# Patient Record
Sex: FEMALE | Race: WHITE | NOT HISPANIC OR LATINO | Employment: OTHER
[De-identification: names, ages, dates, MRNs, and addresses within clinical notes are randomized per-mention and may not be internally consistent; named-entity substitution may affect disease eponyms.]

---

## 2017-08-05 ENCOUNTER — HEALTH MAINTENANCE LETTER (OUTPATIENT)
Age: 32
End: 2017-08-05

## 2019-11-05 ENCOUNTER — HEALTH MAINTENANCE LETTER (OUTPATIENT)
Age: 34
End: 2019-11-05

## 2020-09-12 ENCOUNTER — NURSE TRIAGE (OUTPATIENT)
Dept: NURSING | Facility: CLINIC | Age: 35
End: 2020-09-12

## 2020-09-13 NOTE — TELEPHONE ENCOUNTER
has tested positive for Coronavirus: just found out tonight. He is concerned about his family's exposure and current sx of cough. His wife has a history of type 2 diabetes and takes medication. .  PCP: [facility] ([facility] in [facility].   Advised to contact oncall provider now, or do a virtual visit via internet with [email or web address]. She will call her clinic now.     Reason for Disposition    HIGH RISK patient (e.g., age > 64 years, diabetes, heart or lung disease, weak immune system) (Exception: Has already been evaluated by healthcare provider and has no new or worsening symptoms)    Additional Information    Negative: SEVERE difficulty breathing (e.g., struggling for each breath, speaks in single words)    Negative: Difficult to awaken or acting confused (e.g., disoriented, slurred speech)    Negative: Bluish (or gray) lips or face now    Negative: Shock suspected (e.g., cold/pale/clammy skin, too weak to stand, low BP, rapid pulse)    Negative: Sounds like a life-threatening emergency to the triager    Negative: [1] COVID-19 exposure AND [2] no symptoms    Negative: COVID-19 and Breastfeeding, questions about    Negative: [1] Adult with possible COVID-19 symptoms AND [2] triager concerned about severity of symptoms or other causes    Negative: SEVERE or constant chest pain or pressure (Exception: mild central chest pain, present only when coughing)    Negative: MODERATE difficulty breathing (e.g., speaks in phrases, SOB even at rest, pulse 100-120)    Negative: Patient sounds very sick or weak to the triager    Negative: MILD difficulty breathing (e.g., minimal/no SOB at rest, SOB with walking, pulse <100)    Negative: Chest pain or pressure    Negative: Fever > 103 F (39.4 C)    Negative: [1] Fever > 101 F (38.3 C) AND [2] age > 60    Negative: [1] Fever > 100.0 F (37.8 C) AND [2] bedridden (e.g., nursing home patient, CVA, chronic illness, recovering from surgery)    Protocols used:  CORONAVIRUS (COVID-19) DIAGNOSED OR [id number] 8.4.20

## 2020-11-22 ENCOUNTER — HEALTH MAINTENANCE LETTER (OUTPATIENT)
Age: 35
End: 2020-11-22

## 2021-01-30 ENCOUNTER — APPOINTMENT (OUTPATIENT)
Dept: ULTRASOUND IMAGING | Facility: CLINIC | Age: 36
End: 2021-01-30
Attending: EMERGENCY MEDICINE
Payer: COMMERCIAL

## 2021-01-30 ENCOUNTER — HOSPITAL ENCOUNTER (EMERGENCY)
Facility: CLINIC | Age: 36
Discharge: HOME | End: 2021-01-30
Attending: EMERGENCY MEDICINE | Admitting: EMERGENCY MEDICINE
Payer: COMMERCIAL

## 2021-01-30 ENCOUNTER — APPOINTMENT (OUTPATIENT)
Dept: CT IMAGING | Facility: CLINIC | Age: 36
End: 2021-01-30
Attending: EMERGENCY MEDICINE
Payer: COMMERCIAL

## 2021-01-30 VITALS
TEMPERATURE: 97.8 F | HEART RATE: 103 BPM | DIASTOLIC BLOOD PRESSURE: 90 MMHG | BODY MASS INDEX: 32.44 KG/M2 | WEIGHT: 190 LBS | RESPIRATION RATE: 14 BRPM | SYSTOLIC BLOOD PRESSURE: 130 MMHG | HEIGHT: 64 IN | OXYGEN SATURATION: 100 %

## 2021-01-30 DIAGNOSIS — K59.00 CONSTIPATION, UNSPECIFIED CONSTIPATION TYPE: ICD-10-CM

## 2021-01-30 DIAGNOSIS — R11.0 NAUSEA: ICD-10-CM

## 2021-01-30 DIAGNOSIS — R91.8 PULMONARY NODULES: ICD-10-CM

## 2021-01-30 DIAGNOSIS — R10.31 ABDOMINAL PAIN, RIGHT LOWER QUADRANT: ICD-10-CM

## 2021-01-30 LAB
ALBUMIN SERPL-MCNC: 3.4 G/DL (ref 3.4–5)
ALBUMIN UR-MCNC: NEGATIVE MG/DL
ALP SERPL-CCNC: 93 U/L (ref 40–150)
ALT SERPL W P-5'-P-CCNC: 19 U/L (ref 0–50)
ANION GAP SERPL CALCULATED.3IONS-SCNC: 8 MMOL/L (ref 3–14)
APPEARANCE UR: CLEAR
AST SERPL W P-5'-P-CCNC: 13 U/L (ref 0–45)
B-HCG FREE SERPL-ACNC: <5 IU/L
BACTERIA #/AREA URNS HPF: ABNORMAL /HPF
BASOPHILS # BLD AUTO: 0 10E9/L (ref 0–0.2)
BASOPHILS NFR BLD AUTO: 0.3 %
BILIRUB SERPL-MCNC: 0.3 MG/DL (ref 0.2–1.3)
BILIRUB UR QL STRIP: NEGATIVE
BUN SERPL-MCNC: 13 MG/DL (ref 7–30)
CALCIUM SERPL-MCNC: 8.9 MG/DL (ref 8.5–10.1)
CHLORIDE SERPL-SCNC: 102 MMOL/L (ref 94–109)
CO2 SERPL-SCNC: 24 MMOL/L (ref 20–32)
COLOR UR AUTO: ABNORMAL
CREAT SERPL-MCNC: 0.5 MG/DL (ref 0.52–1.04)
DIFFERENTIAL METHOD BLD: ABNORMAL
EOSINOPHIL # BLD AUTO: 0.2 10E9/L (ref 0–0.7)
EOSINOPHIL NFR BLD AUTO: 1.7 %
ERYTHROCYTE [DISTWIDTH] IN BLOOD BY AUTOMATED COUNT: 11.8 % (ref 10–15)
GFR SERPL CREATININE-BSD FRML MDRD: >90 ML/MIN/{1.73_M2}
GLUCOSE SERPL-MCNC: 288 MG/DL (ref 70–99)
GLUCOSE UR STRIP-MCNC: >1000 MG/DL
HCT VFR BLD AUTO: 42.9 % (ref 35–47)
HGB BLD-MCNC: 14.8 G/DL (ref 11.7–15.7)
HGB UR QL STRIP: NEGATIVE
IMM GRANULOCYTES # BLD: 0 10E9/L (ref 0–0.4)
IMM GRANULOCYTES NFR BLD: 0.1 %
KETONES UR STRIP-MCNC: 10 MG/DL
LEUKOCYTE ESTERASE UR QL STRIP: NEGATIVE
LIPASE SERPL-CCNC: 212 U/L (ref 73–393)
LYMPHOCYTES # BLD AUTO: 4.2 10E9/L (ref 0.8–5.3)
LYMPHOCYTES NFR BLD AUTO: 45.9 %
MCH RBC QN AUTO: 27.4 PG (ref 26.5–33)
MCHC RBC AUTO-ENTMCNC: 34.5 G/DL (ref 31.5–36.5)
MCV RBC AUTO: 79 FL (ref 78–100)
MONOCYTES # BLD AUTO: 0.6 10E9/L (ref 0–1.3)
MONOCYTES NFR BLD AUTO: 6.2 %
MUCOUS THREADS #/AREA URNS LPF: PRESENT /LPF
NEUTROPHILS # BLD AUTO: 4.2 10E9/L (ref 1.6–8.3)
NEUTROPHILS NFR BLD AUTO: 45.8 %
NITRATE UR QL: NEGATIVE
NRBC # BLD AUTO: 0 10*3/UL
NRBC BLD AUTO-RTO: 0 /100
PH UR STRIP: 5.5 PH (ref 5–7)
PLATELET # BLD AUTO: 303 10E9/L (ref 150–450)
POTASSIUM SERPL-SCNC: 3.5 MMOL/L (ref 3.4–5.3)
PROT SERPL-MCNC: 7.3 G/DL (ref 6.8–8.8)
RBC # BLD AUTO: 5.4 10E12/L (ref 3.8–5.2)
RBC #/AREA URNS AUTO: 0 /HPF (ref 0–2)
SODIUM SERPL-SCNC: 134 MMOL/L (ref 133–144)
SOURCE: ABNORMAL
SP GR UR STRIP: 1.01 (ref 1–1.03)
SQUAMOUS #/AREA URNS AUTO: 1 /HPF (ref 0–1)
UROBILINOGEN UR STRIP-MCNC: 0 MG/DL (ref 0–2)
WBC # BLD AUTO: 9.1 10E9/L (ref 4–11)
WBC #/AREA URNS AUTO: 1 /HPF (ref 0–5)

## 2021-01-30 PROCEDURE — 96361 HYDRATE IV INFUSION ADD-ON: CPT

## 2021-01-30 PROCEDURE — 96374 THER/PROPH/DIAG INJ IV PUSH: CPT | Mod: 59

## 2021-01-30 PROCEDURE — 80053 COMPREHEN METABOLIC PANEL: CPT | Performed by: EMERGENCY MEDICINE

## 2021-01-30 PROCEDURE — 250N000011 HC RX IP 250 OP 636: Performed by: EMERGENCY MEDICINE

## 2021-01-30 PROCEDURE — 81001 URINALYSIS AUTO W/SCOPE: CPT | Performed by: EMERGENCY MEDICINE

## 2021-01-30 PROCEDURE — 84702 CHORIONIC GONADOTROPIN TEST: CPT

## 2021-01-30 PROCEDURE — 258N000003 HC RX IP 258 OP 636: Performed by: EMERGENCY MEDICINE

## 2021-01-30 PROCEDURE — 76830 TRANSVAGINAL US NON-OB: CPT

## 2021-01-30 PROCEDURE — 250N000009 HC RX 250: Performed by: EMERGENCY MEDICINE

## 2021-01-30 PROCEDURE — 74177 CT ABD & PELVIS W/CONTRAST: CPT

## 2021-01-30 PROCEDURE — 83690 ASSAY OF LIPASE: CPT | Performed by: EMERGENCY MEDICINE

## 2021-01-30 PROCEDURE — 99285 EMERGENCY DEPT VISIT HI MDM: CPT | Mod: 25

## 2021-01-30 PROCEDURE — 85025 COMPLETE CBC W/AUTO DIFF WBC: CPT | Performed by: EMERGENCY MEDICINE

## 2021-01-30 PROCEDURE — 93976 VASCULAR STUDY: CPT | Mod: XS

## 2021-01-30 RX ORDER — IOPAMIDOL 755 MG/ML
96 INJECTION, SOLUTION INTRAVASCULAR ONCE
Status: COMPLETED | OUTPATIENT
Start: 2021-01-30 | End: 2021-01-30

## 2021-01-30 RX ORDER — GLIPIZIDE 5 MG/1
TABLET ORAL
COMMUNITY
End: 2024-02-26

## 2021-01-30 RX ORDER — KETOROLAC TROMETHAMINE 15 MG/ML
15 INJECTION, SOLUTION INTRAMUSCULAR; INTRAVENOUS ONCE
Status: COMPLETED | OUTPATIENT
Start: 2021-01-30 | End: 2021-01-30

## 2021-01-30 RX ORDER — INSULIN GLARGINE 100 [IU]/ML
INJECTION, SOLUTION SUBCUTANEOUS AT BEDTIME
COMMUNITY
End: 2024-02-26

## 2021-01-30 RX ORDER — POLYETHYLENE GLYCOL 3350 17 G/17G
1 POWDER, FOR SOLUTION ORAL DAILY
Qty: 238 G | Refills: 0 | Status: SHIPPED | OUTPATIENT
Start: 2021-01-30 | End: 2021-02-13

## 2021-01-30 RX ADMIN — SODIUM CHLORIDE 1000 ML: 9 INJECTION, SOLUTION INTRAVENOUS at 08:07

## 2021-01-30 RX ADMIN — IOPAMIDOL 96 ML: 755 INJECTION, SOLUTION INTRAVENOUS at 08:15

## 2021-01-30 RX ADMIN — SODIUM CHLORIDE 67 ML: 9 INJECTION, SOLUTION INTRAVENOUS at 08:15

## 2021-01-30 RX ADMIN — KETOROLAC TROMETHAMINE 15 MG: 15 INJECTION, SOLUTION INTRAMUSCULAR; INTRAVENOUS at 08:07

## 2021-01-30 ASSESSMENT — ENCOUNTER SYMPTOMS
NAUSEA: 1
ABDOMINAL DISTENTION: 1
DYSURIA: 0
VOMITING: 0
FEVER: 0
ABDOMINAL PAIN: 1
FREQUENCY: 0

## 2021-01-30 ASSESSMENT — MIFFLIN-ST. JEOR: SCORE: 1536.83

## 2021-01-30 NOTE — ED PROVIDER NOTES
History   Chief Complaint:  Abdominal Pain       HPI   Isabel Reeder is a 35 year old female who presents with abdominal pain.  The patient reports waxing and waning mid right-sided abdominal pain for about 6 weeks.  If she presses on the spot of maximal discomfort, her pain usually improves.  Upon waking today, her pain was worse and she feels distended.  She has been more constipated recently.  She has also been nauseous, sometimes to the point she is afraid to eat in case that makes it worse.  She had similar abdominal pain last year which was worked up in the ED without any acute pathology found.  Increased stool burden was noted on CT scan and she was instructed to take Miralax after which her pain did subside.  She has an IUD that was placed several years ago and does not really have menstrual periods.  Last month, however, she did have a period.  She denies any history of ovarian cysts.  She has not had any fevers, pain with urination, or urinary frequency.  She has not been checking her blood sugar regularly as she was out of supplies due to lack of insurance.  She now has insurance again and picked up new supplies.  Her PCP just added Lantus to her diabetes medications.    Review of Systems   Constitutional: Negative for fever.   Gastrointestinal: Positive for abdominal distention, abdominal pain and nausea. Negative for vomiting.   Genitourinary: Negative for dysuria, frequency, vaginal bleeding and vaginal discharge.   All other systems reviewed and are negative.    Allergies:  No known drug allergies.     Medications:  Glipizide   Metformin   Lantus  Aspirin  Simvastatin   Loratadine   Flonase    Past Medical History:    Type 2 diabetes mellitus  Hyperlipidemia      Past Surgical History:    Dilation and curettage       Family History:    Hyperlipidemia - mother, father     Social History:  Presents to the ED alone.  Marital Status:     Physical Exam     Patient Vitals for the past 24 hrs:    "BP Temp Temp src Pulse Resp SpO2 Height Weight   01/30/21 0612 (!) 140/92 -- -- 107 14 96 % -- --   01/30/21 0400 (!) 134/90 -- -- 115 15 97 % -- --   01/30/21 0249 (!) 146/101 97.8  F (36.6  C) Oral 129 16 98 % 1.626 m (5' 4\") 86.2 kg (190 lb)       Physical Exam  Constitutional:  Cooperative.   HENT:   Head:    Atraumatic.   Mouth/Throat:   Oropharynx is without erythema or exudate and mucous membranes are moist.   Eyes:    Conjunctivae normal and EOM are normal.      Pupils are equal, round, and reactive to light.   Neck:    Normal range of motion. Neck supple.   Cardiovascular:  Normal rate, regular rhythm, normal heart sounds and radial and dorsalis pedis pulses are 2+ and symmetric.    Pulmonary/Chest:  Effort normal and breath sounds normal.   Abdominal:   Soft. Bowel sounds are normal.      No splenomegaly or hepatomegaly. Tender with deep palpation right mid and right lower abdomen.  No rebound.   Musculoskeletal:  Normal range of motion. No edema and no tenderness.   Neurological:  Alert. Normal strength. No cranial nerve deficit.   Skin:    Skin is warm and dry.   Psychiatric:   Normal mood and affect.     Emergency Department Course     Imaging:  US Pelvis Cmplt w Transvag & Doppler LmtPel Duplex Limited   Preliminary Result   IMPRESSION:    1. IUD is in place within the lower uterine segment.   2. Nonvisualization of the left ovary.      CT Abdomen Pelvis w Contrast    (Results Pending)     Laboratory:  CBC: RBC 5.4 (H), otherwise WNL (WBC 9.1, HGB 14.8, )    CMP: Glucose 288 (H), Creatinine 0.5 (L), otherwise WNL   Lipase: 212  (0408) ISTAT Quantitative hCG: <5    UA: Clear light yellow urine, Glucose >1000, Ketones 10, Bacteria few, Mucous present, otherwise WNL     Emergency Department Course:  Reviewed:  I reviewed nursing notes, vitals and past medical history    Assessments:  (0408) I obtained history and examined the patient as noted above.    I rechecked the patient and explained " findings.     0530 rechecked patient, pain remains the same, still declines pain medicine, awaiting ultrasound.    0745 recheck patient.  Pain remains the same.  She is given a dose of Toradol.  Reexamination shows persistent right-sided tenderness.  She will be sent for CT.    Disposition:  She is signed out to the morning physician at 8 AM, pending CT scan    Impression & Plan     Medical Decision Making:  This patient presented to the Emergency Department with Abdominal Pain.  The clinical exam today is non-specific and non-focal and non-surgical.  The laboratory testing has returned normal, with the exception of elevated blood sugar.  Patient has been changing her insulin doses to better control her sugars.  There is no sign of DKA..  Ultrasound of the pelvis is normal, including normal blood flow to the right ovary.  IUD is in good position.  Left ovary is not seen, but there is no tenderness on the left side.  No free fluid is noted..  The exact etiology of the abdominal pain is not clear.  The differential diagnosis of abdominal pain is protean and includes: Appendicitis, Bowel Obstruction, Ulcer, Ischemia, Cholecystitis, Diverticulitis, Pancreatitis, UTI, Pyelonephritis, Enteritis/Colitis, AAA amongst many other etiologies.  Ovarian cyst, torsion, ectopic pregnancy, pyelonephritis, kidney stones.      Initial suspicion was for pelvic etiology, given that she had had similar symptoms several months ago and a negative CT at that time.  There was delay in getting the patient's ultrasound, and fortunately the ultrasound returned normal.  I reexamined the patient and she is still somewhat tender in the right mid and right lower abdomen.  I remain concerned for appendicitis, thus a CT scan is ordered.    Discussed test results thus far with the patient and she voices understanding.  She is given a dose of Toradol for pain control, and a liter of normal saline.    She is signed out to Dr. Munoz pending evaluation  of the CT.  If this is negative she will be discharged with instructions to follow-up with her OB/GYN, to evaluate the IUD as a possible cause, but appears in good position.  Of note, the patient denies any vaginal discharge or risk for STI.  She declined pelvic exam.    Diagnosis:    ICD-10-CM    1. Abdominal pain, right lower quadrant  R10.31    2. Nausea  R11.0        Scribe Disclosure:  I, Amara Lacey, am serving as a scribe at 3:25 AM on 1/30/2021 to document services personally performed by Faizan Guerrero MD based on my observations and the provider's statements to me.           Faizan Guerrero MD  01/30/21 0892

## 2021-01-30 NOTE — ED NOTES
Pt resting on stretcher, pt denies any needs at this time, call bell in reach, rn will continue to monitor.

## 2021-01-30 NOTE — ED PROVIDER NOTES
Patient was signed out to me awaiting results of her CAT scan.  The findings are as follows    IMPRESSION:   1.  IUD is located in the lower uterine segment, and is oriented   sagittally.   2.  Moderate amount of stool in the ascending and transverse colon.   3.  Indeterminate 0.4 cm left lower lobe pulmonary nodule. Please   refer to pulmonary nodule follow-up guidelines below.     I discussed the results with her.  The IUD looks to be appropriately placed.  Her pain may be from constipation.  She states that she has not had normal bowel movements only 1 every 2 to 3 days which is abnormal for her.  She is not changed her diet recently.  She did start trying to take prune juice and Benefiber with very little success.  At this time I will write her MiraLAX she has used that in the past.  We discussed dietary recommendations as well.  I made her aware of the incidental finding of a pulmonary nodule.  She states she had a CAT scan of her abdomen last May for similar issues.  I was able to view this in the [facility] system but they made no note of anything in the lungs.  She is in the low risk category so formally does not require any follow-up.     Aylin Munoz MD  01/30/21 0919

## 2021-02-01 ENCOUNTER — TRANSFERRED RECORDS (OUTPATIENT)
Dept: HEALTH INFORMATION MANAGEMENT | Facility: CLINIC | Age: 36
End: 2021-02-01

## 2021-02-02 ENCOUNTER — HOSPITAL ENCOUNTER (OUTPATIENT)
Dept: ULTRASOUND IMAGING | Facility: CLINIC | Age: 36
Discharge: HOME | End: 2021-02-02
Attending: NURSE PRACTITIONER | Admitting: NURSE PRACTITIONER
Payer: COMMERCIAL

## 2021-02-02 DIAGNOSIS — M54.2 NECK PAIN: ICD-10-CM

## 2021-02-02 PROCEDURE — 76536 US EXAM OF HEAD AND NECK: CPT

## 2021-05-11 ENCOUNTER — TRANSFERRED RECORDS (OUTPATIENT)
Dept: MULTI SPECIALTY CLINIC | Facility: CLINIC | Age: 36
End: 2021-05-11

## 2021-05-11 LAB
HPV ABSTRACT: NORMAL
PAP-ABSTRACT: NORMAL

## 2021-05-30 ENCOUNTER — HEALTH MAINTENANCE LETTER (OUTPATIENT)
Age: 36
End: 2021-05-30

## 2022-01-09 ENCOUNTER — HEALTH MAINTENANCE LETTER (OUTPATIENT)
Age: 37
End: 2022-01-09

## 2022-01-26 ENCOUNTER — ANCILLARY PROCEDURE (OUTPATIENT)
Dept: CT IMAGING | Facility: CLINIC | Age: 37
End: 2022-01-26
Attending: NURSE PRACTITIONER
Payer: COMMERCIAL

## 2022-01-26 PROCEDURE — 71250 CT THORAX DX C-: CPT | Performed by: RADIOLOGY

## 2022-02-04 ENCOUNTER — TRANSCRIBE ORDERS (OUTPATIENT)
Dept: OTHER | Age: 37
End: 2022-02-04
Payer: COMMERCIAL

## 2022-02-04 DIAGNOSIS — Z80.0 FAMILY HISTORY OF COLON CANCER: Primary | ICD-10-CM

## 2022-02-07 ENCOUNTER — PATIENT OUTREACH (OUTPATIENT)
Dept: ONCOLOGY | Facility: CLINIC | Age: 37
End: 2022-02-07
Payer: COMMERCIAL

## 2022-02-07 ENCOUNTER — DOCUMENTATION ONLY (OUTPATIENT)
Dept: ONCOLOGY | Facility: CLINIC | Age: 37
End: 2022-02-07
Payer: COMMERCIAL

## 2022-02-07 NOTE — PROGRESS NOTES
Action February 7, 2022 9:05 AM ABT   Action Taken Records updated in CE, Image request sent to Tessa 01/13/22: CT Abd Pel & 05/15/20: CT Angela Pel

## 2022-02-22 NOTE — PROGRESS NOTES
2/24/2022    Referring Provider: CT Suresh    Presenting Information:   I met with Isabel Reeder today for her video genetic counseling visit through the Cancer Risk Management Program to discuss her family history of colon and prostate cancer. She is here today to review this history, cancer screening recommendations, and available genetic testing options.    Personal History:  Isabel is a 36 year old female. She does not have any personal history of cancer. In her hormonal-based medical history, she had her first menstrual period at age 11, her first child at age 22, and is premenopausal. Isabel has her ovaries, fallopian tubes and uterus in place, and reports no ovarian cancer screening to date. She reports that she has no history of hormone replacement therapy.  She reports oral contraceptive use for approximately 6 years. Isabel reports having a colonoscopy perfromed 1-2 years ago which was normal. Follow-up was recommended in 5 years. Isabel has not had a mammogram. She denies any history of dermatological exams. She does not regularly do any other cancer screening at this time. Isabel reported no history of smoking and no current alcohol use.    Family History: (Please see scanned pedigree for detailed family history information)    Isabel's mother was diagnosed with colon cancer at age 51. Her mother has not had genetic testing and Isabel does not know if her mother would be interested in genetic testing. She is currently 55.      Maternal grandfather was diagnosed with prostate cancer in his 70's and passed away in his 70's.     There is no other reported family history of cancer on either her maternal or paternal side of the family.     Her maternal and paternal ethnicity is [place]. There is no known Ashkenazi Worship ancestry on either side of her family. There is no reported consanguinity.    Discussion:    Based upon Isabel's current personal and family history, Isabel is likely at low risk  for a hereditary cancer syndrome.    We reviewed the features of sporadic, familial, and hereditary cancers and discussed the features commonly associated with hereditary cancer syndromes. A handout regarding this information will be sent to Isabel and can be found in the after visit summary.    As discussed in our session, her family history is absent for the following features typically seen in high risk families:     Cancers diagnosed under age 50    Multiple relatives with similar cancers on the same side of the family    Cancers in multiple generations    Relatives with certain rare cancers (i.e., male breast cancer)    Because of the absence of these features, it is unlikely that there is a currently identifiable hereditary cancer syndrome present in Isabel's family.      We discussed that insurance coverage for genetic testing is dependent upon personal and family history being suggestive of a hereditary cancer syndrome.    After our discussion, Isabel was not interested in pursuing genetic testing at this time. She plans to discuss genetic testing with her mother since she was diagnosed with cancer and is the most ideal person in the family for testing.     We discussed the importance of Isabel contacting me if her personal or family history changes. This may modify our assessment regarding risk for a hereditary cancer syndrome and/or genetic testing options.     Screening recommendations based upon personal/family history:    Isabel should continue with follow-up colonoscopy screening recommendations as made by her GI providers.     Per National Comprehensive Cancer Network (NCCN) guidelines, individuals with a first degree relative with colorectal cancer diagnosed at any age should begin colonoscopy at age 40, or 10 years before the earliest diagnosis of colorectal cancer, whichever is first.  In this family, colonoscopy should start at age 40. Colonoscopy should be repeated every 5 years, or based on  colonoscopy findings. This would apply to Isabel and her siblings. These recommendations may change based on personal and family history as well as personal preference, and should be discussed with an individuals physician.        Other population cancer screening options, such as those recommended by the American Cancer Society and the National Comprehensive Cancer Network (NCCN), are also appropriate for Isabel and her family. These screening recommendations may change if there are changes to Isabel's personal and/or family history of cancer.     Final screening recommendations should be made by each individual's managing physician.    Of note, these screening recommendations may change should Isabel elect to pursue genetic testing in the future.    Plan:  1) Isabel elected not to have genetic testing at this time. Therefore, no genetic testing was ordered today.   2) Information regarding recommended screening, based upon the family history, was reviewed for Isabel.  3) Isabel will contact me regularly and/or if the family or personal history changes. My contact information was provided.     Isabel is a 36 year old who is being evaluated via a billable video visit.    Face to face time over video: 33 minutes    Marilynn Schwarz MS  Genetic Counseling Intern  (P): [phone]    Attestation: Patient seen, evaluated and discussed with the Genetic Counseling Intern. I have verified the content of the note, which accurately reflects my assessment of the patient and the plan of care.     Supervising Genetic Counselor  Andrew Ponce MS, [facility] [place]  Licensed, Certified Genetic Counselor  Phone: [phone]

## 2022-02-24 ENCOUNTER — VIRTUAL VISIT (OUTPATIENT)
Dept: ONCOLOGY | Facility: CLINIC | Age: 37
End: 2022-02-24
Attending: GENETIC COUNSELOR, MS
Payer: COMMERCIAL

## 2022-02-24 DIAGNOSIS — Z80.0 FAMILY HISTORY OF COLON CANCER: Primary | ICD-10-CM

## 2022-02-24 DIAGNOSIS — Z80.42 FAMILY HISTORY OF PROSTATE CANCER: ICD-10-CM

## 2022-02-24 PROCEDURE — 96040 HC GENETIC COUNSELING, EACH 30 MINUTES: CPT | Mod: GT,95 | Performed by: GENETIC COUNSELOR, MS

## 2022-02-24 NOTE — Clinical Note
2/24/2022         RE: Isabel Reeder  [address] [facility] [address]        Dear Colleague,    Thank you for referring your patient, Isabel Reeder, to the [facility]. Please see a copy of my visit note below.    Video-Visit Details    Type of service: Video Visit    Video Start Time: 2:45  Video End Time: 3:18    Originating Location (pt. Location): Home    Distant Location (provider location): [facility], Cancer Risk Management Program    Platform used for Video Visit: Kami    2/24/2022    Referring Provider: CT Suresh    Presenting Information:   I met with Isabel Reeder today for her video genetic counseling visit through the Cancer Risk Management Program to discuss her family history of colon and prostate cancer. She is here today to review this history, cancer screening recommendations, and available genetic testing options.    Personal History:  Isabel is a 36 year old female. She does not have any personal history of cancer. In her hormonal-based medical history, she had her first menstrual period at age 11, her first child at age 22, and is premenopausal. Isabel has her ovaries, fallopian tubes and uterus in place, and reports no ovarian cancer screening to date. She reports that she has no history of hormone replacement therapy.  She reports oral contraceptive use for approximately 6 years.  She reports a colonoscopy which was normal. Follow-up was recommended in 5 years. Isabel has not had a  mammogram. She denies any history of dermatological exams. She does not regularly do any other cancer screening at this time. Isabel reported no history of smoking and no currnet alcohol use.    Family History: (Please see scanned pedigree for detailed family history information)    Isabel's mother was diagnosed with colon cancer at age 51. Her mother has not had genetic testing and Isabel does not know if her mother would be  interested in genetic testing. She is currently 55.      Maternal grandfather was diagnosed with prostate cancer in his 70's and passed away in his 70's.     There is no other reported family history of cancer on either her maternal or paternal side of the family.     Her maternal ethnicity is [place]. Her paternal ethnicity is [place].  There is no known Ashkenazi Religion ancestry on either side of her family. There is no reported consanguinity.    Discussion:  Isabel's family history of colon cancer may be suggestive of a hereditary cancer syndrome.  We reviewed the features of sporadic, familial, and hereditary cancers. We discussed the natural history and genetics of several hereditary cancer syndromes, including Gonzalez syndrome.   Gonazlez syndrome can be caused by a mutation in one of five genes:  MLH1, MSH2, MSH6, PMS2, and EPCAM. A single mutation in one of the Gonzalez Syndrome genes increases the risk for several cancers, such as colon cancer, endometrial/uterine cancer, gastric cancer, and ovarian cancer. Other cancers have also been reported in some families with Gonzalez Syndrome include pancreatic, bladder, biliary tract, urothelial, small bowel, prostate, breast and brain cancers.  A detailed handout regarding Gonzalez syndrome and the information we discussed will be provided to Iasbel via [employer] and can be found in the after visit summary. Topics included: inheritance pattern, cancer risks, cancer screening recommendations, and also risks, benefits and limitations of testing.  ***  We discussed that there are additional genes that could cause increased risk for colon cancer. As many of these genes present with overlapping features in a family and accurate cancer risk cannot always be established based upon the pedigree analysis alone, it would be reasonable for Isabel to consider panel genetic testing to analyze multiple genes at once.  We reviewed genetic testing options for Isabel based on her personal and  family history: a panel of genes associated with an increased risk for hereditary colorectal cancers, or larger panel options to include genes associated with increased risk for multiple different cancer types.   After our discussion, Isabel was not interested in pursuing genetic testing at this time. She planned to discuss genetic testing with her mother since she is the most ideal person in the family for testing.   We discussed the importance of Isabel contacting me if her personal or family history changes. This may modify our assessment regarding risk for a hereditary cancer syndrome and/or genetic testing options.  Screening recommendations based upon personal/family history:     *** colon   Final screening recommendations should be made by each individual's managing physician.   Of note, these screening recommendations may change should Isabel elect to pursue genetic testing in the future.    Plan:  1) Isabel elected not to have genetic testing at this time. Therefore, no genetic testing was ordered today.   2) Information regarding recommended screening, based upon the family history, was reviewed for Isabel.  3) Isabel will contact me regularly and/or if the family or personal history changes. My contact information was provided.     Isabel is a 36 year old who is being evaluated via a billable video visit.    Face to face time over video: 33 minutes    Marilynn Schwarz MS  Genetic Counseling Intern  (P): [phone]    ***    Attestation: Patient seen, evaluated and discussed with the Genetic Counseling Intern. I have verified the content of the note, which accurately reflects my assessment of the patient and the plan of care.     Supervising Genetic Counselor  Andrew Ponce MS, [facility] [place]  Licensed, Certified Genetic Counselor  (P): [phone]  (F): [phone]      Again, thank you for allowing me to participate in the care of your patient.        Sincerely,        Andrew Ponce GC

## 2022-02-24 NOTE — PATIENT INSTRUCTIONS
Assessing Cancer Risk  Only about 5-10% of cancers are thought to be due to an inherited cancer susceptibility gene.    These families often have:    Several people with the same or related types of cancer    Cancers diagnosed at a young age (before age 50)    Individuals with more than one primary cancer    Multiple generations of the family affected with cancer    Genetic Testing  Genetic testing involves a simple blood test and will look at the genetic information in select genes for any harmful mutations that are associated with increased cancer risk.  If possible, it is recommended that the person(s) who has had cancer be tested before other family members.  That person will give us the most useful information about whether or not a specific gene is associated with the cancer in the family.     Results  There are three possible results of genetic testing:    Positive--a harmful mutation was identified    Negative--no mutation was identified    Variant of unknown significance--a variation in one of the genes was identified, but it is unclear how this impacts cancer risk in the family    Advantages and Disadvantages  There are advantages and disadvantages to genetic testing of these genes.    Advantages    May clarify your cancer risk    Can help you make medical decisions    May explain the cancers in your family    May give useful information to your family members (if you share your results)    Disadvantages    Possible negative emotional impact of learning about inherited cancer risk    Uncertainty in interpreting a negative test result in some situations    Possible genetic discrimination concerns (see below)    Inheritance   Mutations in most cancer risk genes are inherited in an autosomal dominant pattern.  This means that if a parent has a mutation, each of his or her children will have a 50% chance of inheriting that same mutation.  Therefore, each child--male or female--would have a 50% chance  of being at increased risk for developing cancer.                                              Image obtained from Genetics Home Reference, 2013     Genetic Information Nondiscrimination Act (NÉSTOR)  NÉSTOR is a federal law that protects individuals from health insurance or employment discrimination based on a genetic test result alone.  Although rare, there are currently no legal protections in terms of life insurance, long term care, or disability insurances.  Visit the National Human Genome Research Bruno at Genome.gov/79691113 to learn more.    Reducing Cancer Risk  If a harmful mutation is found in a cancer risk gene, there may be certain screens or preventative surgeries that can be offered. This information will be discussed after genetic testing is completed. If no mutations are found on genetic testing, screening is then recommended based on personal and/or family history of cancer.     Questions to Think About Regarding Genetic Testing    What effect will the test result have on me and my relationship with my family members if I have an inherited gene mutation?  If I don t have a gene mutation?    Should I share my test results, and how will my family react to this news, which may also affect them?    Are my children ready to learn new information that may one day affect their own health?    Resources  American Cancer Society (ACS) cancer.org   National Cancer Bruno (NCI) cancer.gov     Please call us if you have any questions or concerns.   Cancer Risk Management Program [phone]-CANCER ([phone])  ? Andrew Ponce, MS, [facility] [phone]  ? Genesis Castano, MS, [facility]  [phone]  ? Radha Perry, MS, [facility]  [phone]  ? Anabel Man, MS, [facility] [phone]  ? Mahnaz Leahy, MS, [facility] [phone]  ? Sachi Ng, MS, [facility]  [phone]  ? Marilynn Schwarz, MS  [phone]

## 2022-02-24 NOTE — LETTER
2/24/2022      RE: Isabel Reeder  [address] [facility] [address]       Video-Visit Details    Type of service: Video Visit    Video Start Time: 2:45  Video End Time: 3:18    Originating Location (pt. Location): Home    Distant Location (provider location): [facility], Cancer Risk Management Program    Platform used for Video Visit: Kami    2/24/2022    Referring Provider: CT Suresh    Presenting Information:   I met with Isabel Reeder today for her video genetic counseling visit through the Cancer Risk Management Program to discuss her family history of colon and prostate cancer. She is here today to review this history, cancer screening recommendations, and available genetic testing options.    Personal History:  Isabel is a 36 year old female. She does not have any personal history of cancer. In her hormonal-based medical history, she had her first menstrual period at age 11, her first child at age 22, and is premenopausal. Isabel has her ovaries, fallopian tubes and uterus in place, and reports no ovarian cancer screening to date. She reports that she has no history of hormone replacement therapy.  She reports oral contraceptive use for approximately 6 years. Isabel reports having a colonoscopy perfromed 1-2 years ago which was normal. Follow-up was recommended in 5 years. Isabel has not had a mammogram. She denies any history of dermatological exams. She does not regularly do any other cancer screening at this time. Isabel reported no history of smoking and no current alcohol use.    Family History: (Please see scanned pedigree for detailed family history information)    Isabel's mother was diagnosed with colon cancer at age 51. Her mother has not had genetic testing and Isabel does not know if her mother would be interested in genetic testing. She is currently 55.      Maternal grandfather was diagnosed with prostate cancer in his 70's and passed away in his  70's.     There is no other reported family history of cancer on either her maternal or paternal side of the family.     Her maternal and paternal ethnicity is [place]ese. There is no known Ashkenazi Christian ancestry on either side of her family. There is no reported consanguinity.    Discussion:    Based upon Isabel's current personal and family history, Isabel is likely at low risk for a hereditary cancer syndrome.    We reviewed the features of sporadic, familial, and hereditary cancers and discussed the features commonly associated with hereditary cancer syndromes. A handout regarding this information will be sent to Isabel and can be found in the after visit summary.    As discussed in our session, her family history is absent for the following features typically seen in high risk families:     Cancers diagnosed under age 50    Multiple relatives with similar cancers on the same side of the family    Cancers in multiple generations    Relatives with certain rare cancers (i.e., male breast cancer)    Cancers associated with significant environmental exposures (i.e., smoking and lung cancer)    Because of the absence of these features, it is unlikely that there is a currently identifiable hereditary cancer syndrome present in Isabel's family.      We discussed that insurance coverage for genetic testing is dependent upon personal and family history being suggestive of a hereditary cancer syndrome.    After our discussion, Isabel was not interested in pursuing genetic testing at this time. She planned to discuss genetic testing with her mother since she was diagnosed with cancer is the most ideal person in the family for testing.     We discussed the importance of Isabel contacting me if her personal or family history changes. This may modify our assessment regarding risk for a hereditary cancer syndrome and/or genetic testing options.     Screening recommendations based upon personal/family history:    Isabel should  continue with follow-up colonoscopy screening recommendations as made by her GI providers.     Per National Comprehensive Cancer Network (NCCN) guidelines, individuals with a first degree relative with colorectal cancer diagnosed at any age should begin colonoscopy at age 40, or 10 years before the earliest diagnosis of colorectal cancer, whichever is first.  In this family, colonoscopy should start at age 40.  Colonoscopy should be repeated every 5 years, or based on colonoscopy findings.  This would apply to Isabel and her siblings. These recommendations may change based on personal and family history as well as personal preference, and should be discussed with an individuals physician.        Other population cancer screening options, such as those recommended by the American Cancer Society and the National Comprehensive Cancer Network (NCCN), are also appropriate for Isabel and her family. These screening recommendations may change if there are changes to Isabel's personal and/or family history of cancer.     Final screening recommendations should be made by each individual's managing physician.    Of note, these screening recommendations may change should Isabel elect to pursue genetic testing in the future.    Plan:  1) Isabel elected not to have genetic testing at this time. Therefore, no genetic testing was ordered today.   2) Information regarding recommended screening, based upon the family history, was reviewed for Isabel.  3) Isabel will contact me regularly and/or if the family or personal history changes. My contact information was provided.     Isabel is a 36 year old who is being evaluated via a billable video visit.    Face to face time over video: 33 minutes    Marilynn Schwarz MS  Genetic Counseling Intern  (P): [phone]    ***    Attestation: Patient seen, evaluated and discussed with the Genetic Counseling Intern. I have verified the content of the note, which accurately reflects my  assessment of the patient and the plan of care.     Supervising Genetic Counselor  Andrew Ponce MS, [facility]  Licensed, Certified Genetic Counselor  (P): [phone]  (F): [phone]      Andrew Ponce,

## 2022-02-24 NOTE — LETTER
Cancer Risk Management  Program Locations    [facility]  [facility]  [facility]  [facility]  [facility]  Mailing Address  Cancer Risk Management Program  [facility]  [address]  [place] [address]    New patient appointments  [phone]  February 24, 2022    Isabel Reeder  [address]  [place] [address]    Dear Isabel,    It was a pleasure meeting with you on February 24, 2022. Here is a copy of the progress note from your recent genetic counseling visit to the Cancer Risk Management Program. If you have any additional questions, please feel free to call.    Referring Provider: CT Suresh    Presenting Information:   I met with Isabel Reeder today for her video genetic counseling visit through the Cancer Risk Management Program to discuss her family history of colon and prostate cancer. She is here today to review this history, cancer screening recommendations, and available genetic testing options.    Personal History:  Isabel is a 36 year old female. She does not have any personal history of cancer. In her hormonal-based medical history, she had her first menstrual period at age 11, her first child at age 22, and is premenopausal. Isabel has her ovaries, fallopian tubes and uterus in place, and reports no ovarian cancer screening to date. She reports that she has no history of hormone replacement therapy.  She reports oral contraceptive use for approximately 6 years. Isabel reports having a colonoscopy perfromed 1-2 years ago which was normal. Follow-up was recommended in 5 years. Isabel has not had a mammogram. She denies any history of dermatological exams. She does not regularly do any other cancer screening at this time. Isabel reported no history of smoking and no current alcohol use.    Family History: (Please see scanned  pedigree for detailed family history information)    Isabel's mother was diagnosed with colon cancer at age 51. Her mother has not had genetic testing and Isabel does not know if her mother would be interested in genetic testing. She is currently 55.      Maternal grandfather was diagnosed with prostate cancer in his 70's and passed away in his 70's.     There is no other reported family history of cancer on either her maternal or paternal side of the family.     Her maternal and paternal ethnicity is Vietnamese. There is no known Ashkenazi Sabianist ancestry on either side of her family. There is no reported consanguinity.    Discussion:    Based upon Isabel's current personal and family history, Isabel is likely at low risk for a hereditary cancer syndrome.    We reviewed the features of sporadic, familial, and hereditary cancers and discussed the features commonly associated with hereditary cancer syndromes. A handout regarding this information will be sent to Isabel and can be found in the after visit summary.    As discussed in our session, her family history is absent for the following features typically seen in high risk families:     Cancers diagnosed under age 50    Multiple relatives with similar cancers on the same side of the family    Cancers in multiple generations    Relatives with certain rare cancers (i.e., male breast cancer)    Cancers associated with significant environmental exposures (i.e., smoking and lung cancer)    Because of the absence of these features, it is unlikely that there is a currently identifiable hereditary cancer syndrome present in Isabel's family.      We discussed that insurance coverage for genetic testing is dependent upon personal and family history being suggestive of a hereditary cancer syndrome.    After our discussion, Isabel was not interested in pursuing genetic testing at this time. She planned to discuss genetic testing with her mother since she was diagnosed with  cancer is the most ideal person in the family for testing.     We discussed the importance of Isabel contacting me if her personal or family history changes. This may modify our assessment regarding risk for a hereditary cancer syndrome and/or genetic testing options.     Screening recommendations based upon personal/family history:    Isabel should continue with follow-up colonoscopy screening recommendations as made by her GI providers.     Per National Comprehensive Cancer Network (NCCN) guidelines, individuals with a first degree relative with colorectal cancer diagnosed at any age should begin colonoscopy at age 40, or 10 years before the earliest diagnosis of colorectal cancer, whichever is first.  In this family, colonoscopy should start at age 40.  Colonoscopy should be repeated every 5 years, or based on colonoscopy findings.  This would apply to Isabel and her siblings. These recommendations may change based on personal and family history as well as personal preference, and should be discussed with an individuals physician.        Other population cancer screening options, such as those recommended by the American Cancer Society and the National Comprehensive Cancer Network (NCCN), are also appropriate for Isabel and her family. These screening recommendations may change if there are changes to Isabel's personal and/or family history of cancer.     Final screening recommendations should be made by each individual's managing physician.    Of note, these screening recommendations may change should Isabel elect to pursue genetic testing in the future.        Plan:  1) Isabel elected not to have genetic testing at this time. Therefore, no genetic testing was ordered today.   2) Information regarding recommended screening, based upon the family history, was reviewed for Isabel.  3) Isabel will contact me regularly and/or if the family or personal history changes. My contact information was provided.     Isabel  is a 36 year old who is being evaluated via a billable video visit.    Face to face time over video: 33 minutes    Marilynn Schwarz MS  Genetic Counseling Intern  (P): [phone]    ***    Attestation: Patient seen, evaluated and discussed with the Genetic Counseling Intern. I have verified the content of the note, which accurately reflects my assessment of the patient and the plan of care.     Supervising Genetic Counselor  Andrew Ponce MS, [facility]  Licensed, Certified Genetic Counselor  (P): [phone]  (F): [phone]

## 2022-08-21 ENCOUNTER — HEALTH MAINTENANCE LETTER (OUTPATIENT)
Age: 37
End: 2022-08-21

## 2022-11-20 ENCOUNTER — HEALTH MAINTENANCE LETTER (OUTPATIENT)
Age: 37
End: 2022-11-20

## 2023-01-16 ENCOUNTER — APPOINTMENT (OUTPATIENT)
Dept: CT IMAGING | Facility: CLINIC | Age: 38
End: 2023-01-16
Attending: EMERGENCY MEDICINE
Payer: COMMERCIAL

## 2023-01-16 ENCOUNTER — HOSPITAL ENCOUNTER (EMERGENCY)
Facility: CLINIC | Age: 38
Discharge: HOME | End: 2023-01-16
Attending: EMERGENCY MEDICINE | Admitting: EMERGENCY MEDICINE
Payer: COMMERCIAL

## 2023-01-16 VITALS
DIASTOLIC BLOOD PRESSURE: 90 MMHG | OXYGEN SATURATION: 100 % | HEART RATE: 104 BPM | RESPIRATION RATE: 16 BRPM | BODY MASS INDEX: 33.47 KG/M2 | WEIGHT: 195 LBS | SYSTOLIC BLOOD PRESSURE: 136 MMHG | TEMPERATURE: 98.4 F

## 2023-01-16 DIAGNOSIS — R51.9 ACUTE NONINTRACTABLE HEADACHE, UNSPECIFIED HEADACHE TYPE: ICD-10-CM

## 2023-01-16 DIAGNOSIS — J32.3 SPHENOID SINUSITIS, UNSPECIFIED CHRONICITY: ICD-10-CM

## 2023-01-16 DIAGNOSIS — M54.2 NECK PAIN: ICD-10-CM

## 2023-01-16 LAB
CREAT SERPL-MCNC: 0.47 MG/DL (ref 0.52–1.04)
GFR SERPL CREATININE-BSD FRML MDRD: >90 ML/MIN/1.73M2
HCG UR QL: NEGATIVE
INTERNAL QC OK POCT: NORMAL
POCT KIT EXPIRATION DATE: 0
POCT KIT LOT NUMBER: 0

## 2023-01-16 PROCEDURE — 82565 ASSAY OF CREATININE: CPT | Performed by: EMERGENCY MEDICINE

## 2023-01-16 PROCEDURE — 250N000009 HC RX 250: Performed by: EMERGENCY MEDICINE

## 2023-01-16 PROCEDURE — 96375 TX/PRO/DX INJ NEW DRUG ADDON: CPT | Performed by: EMERGENCY MEDICINE

## 2023-01-16 PROCEDURE — 81025 URINE PREGNANCY TEST: CPT | Performed by: EMERGENCY MEDICINE

## 2023-01-16 PROCEDURE — 250N000011 HC RX IP 250 OP 636: Performed by: EMERGENCY MEDICINE

## 2023-01-16 PROCEDURE — 36415 COLL VENOUS BLD VENIPUNCTURE: CPT | Performed by: EMERGENCY MEDICINE

## 2023-01-16 PROCEDURE — 99284 EMERGENCY DEPT VISIT MOD MDM: CPT | Performed by: EMERGENCY MEDICINE

## 2023-01-16 PROCEDURE — 70498 CT ANGIOGRAPHY NECK: CPT

## 2023-01-16 PROCEDURE — 70498 CT ANGIOGRAPHY NECK: CPT | Mod: XS

## 2023-01-16 PROCEDURE — 258N000003 HC RX IP 258 OP 636: Performed by: EMERGENCY MEDICINE

## 2023-01-16 PROCEDURE — 96374 THER/PROPH/DIAG INJ IV PUSH: CPT | Mod: 59 | Performed by: EMERGENCY MEDICINE

## 2023-01-16 PROCEDURE — 99285 EMERGENCY DEPT VISIT HI MDM: CPT | Mod: 25 | Performed by: EMERGENCY MEDICINE

## 2023-01-16 PROCEDURE — 70450 CT HEAD/BRAIN W/O DYE: CPT | Mod: XS

## 2023-01-16 PROCEDURE — 96361 HYDRATE IV INFUSION ADD-ON: CPT | Performed by: EMERGENCY MEDICINE

## 2023-01-16 RX ORDER — METOCLOPRAMIDE HYDROCHLORIDE 5 MG/ML
5 INJECTION INTRAMUSCULAR; INTRAVENOUS ONCE
Status: COMPLETED | OUTPATIENT
Start: 2023-01-16 | End: 2023-01-16

## 2023-01-16 RX ORDER — KETOROLAC TROMETHAMINE 30 MG/ML
30 INJECTION, SOLUTION INTRAMUSCULAR; INTRAVENOUS ONCE
Status: COMPLETED | OUTPATIENT
Start: 2023-01-16 | End: 2023-01-16

## 2023-01-16 RX ORDER — CEFIXIME 400 MG/1
400 CAPSULE ORAL DAILY
Qty: 10 CAPSULE | Refills: 0 | Status: SHIPPED | OUTPATIENT
Start: 2023-01-16 | End: 2023-01-26

## 2023-01-16 RX ORDER — IOPAMIDOL 755 MG/ML
100 INJECTION, SOLUTION INTRAVASCULAR ONCE
Status: COMPLETED | OUTPATIENT
Start: 2023-01-16 | End: 2023-01-16

## 2023-01-16 RX ADMIN — IOPAMIDOL 75 ML: 755 INJECTION, SOLUTION INTRAVENOUS at 21:05

## 2023-01-16 RX ADMIN — METOCLOPRAMIDE HYDROCHLORIDE 5 MG: 5 INJECTION INTRAMUSCULAR; INTRAVENOUS at 19:47

## 2023-01-16 RX ADMIN — SODIUM CHLORIDE 1000 ML: 9 INJECTION, SOLUTION INTRAVENOUS at 19:47

## 2023-01-16 RX ADMIN — KETOROLAC TROMETHAMINE 30 MG: 30 INJECTION, SOLUTION INTRAMUSCULAR; INTRAVENOUS at 19:47

## 2023-01-16 RX ADMIN — SODIUM CHLORIDE 80 ML: 9 INJECTION, SOLUTION INTRAVENOUS at 21:21

## 2023-01-16 ASSESSMENT — ACTIVITIES OF DAILY LIVING (ADL)
ADLS_ACUITY_SCORE: 33
ADLS_ACUITY_SCORE: 35

## 2023-01-16 NOTE — ED TRIAGE NOTES
Triage Assessment     Row Name 01/16/23 [id number]       Triage Assessment (Adult)    Airway WDL WDL       Respiratory WDL    Respiratory WDL WDL       Skin Circulation/Temperature WDL    Skin Circulation/Temperature WDL WDL       Cardiac WDL    Cardiac WDL WDL       Peripheral/Neurovascular WDL    Peripheral Neurovascular WDL WDL       Cognitive/Neuro/Behavioral WDL    Cognitive/Neuro/Behavioral WDL WDL

## 2023-01-17 NOTE — DISCHARGE INSTRUCTIONS
Take complete course of cefixime.  Take ibuprofen and acetaminophen as needed for discomfort.  Drink plenty of fluids.  Follow-up with your ENT.    Return if fever, vomiting, worse, or other concerns

## 2023-01-17 NOTE — ED PROVIDER NOTES
ED Provider Note  [facility]      History     Chief Complaint   Patient presents with     Neck Pain     Woke up with bilateral posterolateral neck pain a few days ago, no trauma, radiates into bilateral jaw and into face and forehead, pain worsens with rotation of neck, positive for covid 1/3     HPI  Isabel Reeder is a 37 year old female presents to the emergency department complaining of neck pain and headache for the past 2 days.  The patient states that she awoke 2 days ago in the morning with pain in the posterior lateral neck as well as in the anterolateral neck.  She states it radiates to her forehead.  Patient states that when the pain is at its worst, she has associated nausea but no vomiting.  The patient denies any fever. She denies any visual disturbance including diplopia or blurred vision.  She denies any weakness or numbness.  She denies any chest pain or dyspnea.  No abdominal pain.  She was diagnosed with COVID on 1/3/2023.  She did have a sore throat associated with this but little other symptoms.  Her sore throat has resolved.  She complains of some fullness in her left ear.  She denies any rhinorrhea.  Patient does state that she gets chiropractic care and manipulation of her neck.  The last time she had that was just after [place].  She occasionally will crack her neck herself as well.  Reviewed note and results from 1/03/2023 [facility] visit with positive COVID test and negative strep test.    Past Medical History  Past Medical History:   Diagnosis Date     Gestational diabetes 2007     Hyperlipidemia LDL goal <100 8/6/2008    Hyperlipidemia  dx age 8     Type 2 diabetes, HbA1c goal < 7% (H) 7/08     Past Surgical History:   Procedure Laterality Date     HC DILATION/CURETTAGE DIAG/THER NON OB  2006     cefixime (SUPRAX) 400 MG capsule  ASPIRIN 81 MG OR TABS  BLOOD GLUCOSE METER KIT  BLOOD GLUCOSE TEST STRIPS STRP  Fluticasone Propionate (FLONASE INHA 50  MCG/DOSE NA)  glipiZIDE (GLUCOTROL) 5 MG tablet  glucose blood VI test strips (ACCU-CHEK TASH) strip  insulin glargine (LANTUS VIAL) 100 UNIT/ML vial  LANCETS MISC. KIT  Loratadine (CLARITIN PO)  METFORMIN HCL 1000 MG PO TABS  MIRENA 20 MCG/24HR IU IUD  SIMVASTATIN 40 MG PO TABS      Allergies   Allergen Reactions     No Known Drug Allergies      Family History  Family History   Problem Relation Age of Onset     Lipids Mother      Lipids Father      Eye Disorder Maternal Grandmother         glasses     Eye Disorder Maternal Grandfather         glasses     Eye Disorder Paternal Grandmother         glasses     Eye Disorder Paternal Grandfather         glasses     Circulatory Maternal Grandmother      Circulatory Paternal Grandfather      Hypertension Paternal Grandfather      Diabetes Maternal Grandmother         type 2     Diabetes Paternal Grandfather         type 2     C.A.D. Maternal Grandfather      Social History   Social History     Tobacco Use     Smoking status: Former     Tobacco comments:     occasional   - few every 6 months   Substance Use Topics     Alcohol use: Yes     Comment: occasional     Drug use: No         A medically appropriate review of systems was performed with pertinent positives and negatives noted in the HPI, and all other systems negative.    Physical Exam   BP: (!) 136/90  Pulse: 104  Temp: 98.4  F (36.9  C)  Resp: 16  Weight: 88.5 kg (195 lb)  SpO2: 100 %  Physical Exam  Vitals and nursing note reviewed.   Constitutional:       General: She is not in acute distress.     Appearance: Normal appearance. She is not diaphoretic.   HENT:      Head: Normocephalic and atraumatic.      Nose: Nose normal.      Mouth/Throat:      Pharynx: No oropharyngeal exudate.   Eyes:      General: No scleral icterus.     Pupils: Pupils are equal, round, and reactive to light.   Cardiovascular:      Rate and Rhythm: Normal rate and regular rhythm.      Pulses: Normal pulses.      Heart sounds: Normal heart  sounds.   Pulmonary:      Effort: Pulmonary effort is normal. No respiratory distress.      Breath sounds: Normal breath sounds.   Abdominal:      General: Bowel sounds are normal.      Palpations: Abdomen is soft.      Tenderness: There is no abdominal tenderness.   Musculoskeletal:         General: No tenderness. Normal range of motion.      Cervical back: Normal range of motion.   Skin:     General: Skin is warm and dry.      Findings: No rash.   Neurological:      General: No focal deficit present.      Mental Status: She is alert.      Cranial Nerves: No cranial nerve deficit.      Sensory: No sensory deficit.      Motor: No weakness.      Coordination: Coordination normal.      Gait: Gait normal.   Psychiatric:         Mood and Affect: Mood normal.           ED Course, Procedures, & Data      Procedures             Results for orders placed or performed during the hospital encounter of 01/16/23   CTA Head Neck with Contrast     Status: None    Narrative    EXAM: CTA HEAD NECK W CONTRAST  LOCATION: [facility]  DATE/TIME: 1/16/2023 9:30 PM    INDICATION: Neck pain radiating to head. Clinical concern for dissection.  COMPARISON: Head CT 01/16/2023  CONTRAST: 75mL Isovue 370  TECHNIQUE: Head and neck CT angiogram with IV contrast. Axial helical CT images of the head and neck vessels obtained during the arterial phase of intravenous contrast administration. Axial 2D reconstructed images and multiplanar 3D MIP reconstructed   images of the head and neck vessels were performed by the technologist. Dose reduction techniques were used. All stenosis measurements made according to NASCET criteria unless otherwise specified.    FINDINGS:   HEAD CTA:  ANTERIOR CIRCULATION: No stenosis/occlusion, aneurysm, or high flow vascular malformation. Standard [place] anatomy.    POSTERIOR CIRCULATION: No stenosis/occlusion, aneurysm, or high flow vascular malformation. Balanced  vertebral arteries supply a normal basilar artery.     DURAL VENOUS SINUSES: Expected enhancement of the major dural venous sinuses.    NECK CTA:  RIGHT CAROTID: No measurable stenosis or dissection.    LEFT CAROTID: No measurable stenosis or dissection.    VERTEBRAL ARTERIES: No focal stenosis or dissection. Balanced vertebral arteries.    AORTIC ARCH: Classic aortic arch anatomy with no significant stenosis at the origin of the great vessels.    NONVASCULAR STRUCTURES: Partially opacified right sphenoid sinus.      Impression    IMPRESSION:   HEAD CTA:   1.  Normal CTA [place].  2.  Partially opacified right sphenoid sinus. Correlate for sphenoid sinusitis.    NECK CTA:  1.  Normal neck CTA.   CTV Head Neck w Contrast     Status: None    Narrative    EXAM: CTV HEAD NECK W CONTRAST  LOCATION: [facility]  DATE/TIME: 1/16/2023 9:29 PM    INDICATION: Headache evaluate for cerebral venous thrombosis  COMPARISON: None.  CONTRAST: 75mL Isovue 370  TECHNIQUE: Head CT venogram with IV contrast. Axial helical CT images of the intracranial and neck vessels obtained during the venous phase of intravenous contrast administration. Axial helical 2D reconstructed images and multiplanar 3D MIP reconstructed   images of the intracranial vessels were performed by the technologist. Dose reduction techniques were used.     FINDINGS:   HEAD CTV:  DURAL SINUSES: No significant stenosis or occlusion. Dominant right and smaller left transverse and sigmoid dural sinuses.    INTERNAL CEREBRAL VEINS: No significant stenosis or occlusion.      MAJOR CORTICAL VENOUS BRANCHES: No significant stenosis or occlusion.    OTHER: Normal postcontrast images of the intracranial contents. Partially opacified right sphenoid sinus sinus again noted.    NECK CTV:  No venous thrombus noted in the neck. Normal internal jugular veins. The right is dominant.    OTHER: Grossly normal neck soft tissues.       Impression    IMPRESSION:   HEAD CTV:  1.  Normal intracranial venous structures.  2.  Partially opacified right sphenoid sinus. Correlate for sphenoid sinusitis.    NECK CTV:   1.  Normal neck venous structures.   CT Head w/o Contrast     Status: None    Narrative    EXAM: CT HEAD W/O CONTRAST  LOCATION: [facility]  DATE/TIME: 1/16/2023 9:21 PM    INDICATION: headache  COMPARISON: None.  TECHNIQUE: Routine CT Head without IV contrast. Multiplanar reformats. Dose reduction techniques were used.    FINDINGS:  INTRACRANIAL CONTENTS: No intracranial hemorrhage, extraaxial collection, or mass effect.  No CT evidence of acute infarct. Normal parenchymal attenuation. Normal ventricles and sulci.     VISUALIZED ORBITS/SINUSES/MASTOIDS: No intraorbital abnormality. Moderate mucosal thickening and perhaps some fluid in the sphenoid sinus. Trace mucosal thickening ethmoid air cells. No middle ear or mastoid effusion.    BONES/SOFT TISSUES: No acute abnormality.      Impression    IMPRESSION:  1.  No acute intracranial abnormality.  2.  Moderate mucosal thickening and perhaps a small amount of fluid noted in the sphenoid sinus with trace mucosal thickening ethmoid air cells. Correlate for sinusitis symptoms.   Creatinine     Status: Abnormal   Result Value Ref Range    Creatinine 0.47 (L) 0.52 - 1.04 mg/dL    GFR Estimate >90 >60 mL/min/1.73m2   hCG qual urine POCT     Status: Normal   Result Value Ref Range    HCG Qual Urine Negative Negative    Internal QC Check POCT Valid Valid    POCT Kit Lot Number 0     POCT Kit Expiration Date 0              Results for orders placed or performed during the hospital encounter of 01/16/23   CTA Head Neck with Contrast     Status: None    Narrative    EXAM: CTA HEAD NECK W CONTRAST  LOCATION: [facility]  DATE/TIME: 1/16/2023 9:30 PM    INDICATION: Neck pain radiating to head. Clinical concern for  dissection.  COMPARISON: Head CT 01/16/2023  CONTRAST: 75mL Isovue 370  TECHNIQUE: Head and neck CT angiogram with IV contrast. Axial helical CT images of the head and neck vessels obtained during the arterial phase of intravenous contrast administration. Axial 2D reconstructed images and multiplanar 3D MIP reconstructed   images of the head and neck vessels were performed by the technologist. Dose reduction techniques were used. All stenosis measurements made according to NASCET criteria unless otherwise specified.    FINDINGS:   HEAD CTA:  ANTERIOR CIRCULATION: No stenosis/occlusion, aneurysm, or high flow vascular malformation. Standard [place] anatomy.    POSTERIOR CIRCULATION: No stenosis/occlusion, aneurysm, or high flow vascular malformation. Balanced vertebral arteries supply a normal basilar artery.     DURAL VENOUS SINUSES: Expected enhancement of the major dural venous sinuses.    NECK CTA:  RIGHT CAROTID: No measurable stenosis or dissection.    LEFT CAROTID: No measurable stenosis or dissection.    VERTEBRAL ARTERIES: No focal stenosis or dissection. Balanced vertebral arteries.    AORTIC ARCH: Classic aortic arch anatomy with no significant stenosis at the origin of the great vessels.    NONVASCULAR STRUCTURES: Partially opacified right sphenoid sinus.      Impression    IMPRESSION:   HEAD CTA:   1.  Normal CTA [place].  2.  Partially opacified right sphenoid sinus. Correlate for sphenoid sinusitis.    NECK CTA:  1.  Normal neck CTA.   CTV Head Neck w Contrast     Status: None    Narrative    EXAM: CTV HEAD NECK W CONTRAST  LOCATION: [facility]  DATE/TIME: 1/16/2023 9:29 PM    INDICATION: Headache evaluate for cerebral venous thrombosis  COMPARISON: None.  CONTRAST: 75mL Isovue 370  TECHNIQUE: Head CT venogram with IV contrast. Axial helical CT images of the intracranial and neck vessels obtained during the venous phase of intravenous  contrast administration. Axial helical 2D reconstructed images and multiplanar 3D MIP reconstructed   images of the intracranial vessels were performed by the technologist. Dose reduction techniques were used.     FINDINGS:   HEAD CTV:  DURAL SINUSES: No significant stenosis or occlusion. Dominant right and smaller left transverse and sigmoid dural sinuses.    INTERNAL CEREBRAL VEINS: No significant stenosis or occlusion.      MAJOR CORTICAL VENOUS BRANCHES: No significant stenosis or occlusion.    OTHER: Normal postcontrast images of the intracranial contents. Partially opacified right sphenoid sinus sinus again noted.    NECK CTV:  No venous thrombus noted in the neck. Normal internal jugular veins. The right is dominant.    OTHER: Grossly normal neck soft tissues.      Impression    IMPRESSION:   HEAD CTV:  1.  Normal intracranial venous structures.  2.  Partially opacified right sphenoid sinus. Correlate for sphenoid sinusitis.    NECK CTV:   1.  Normal neck venous structures.   CT Head w/o Contrast     Status: None    Narrative    EXAM: CT HEAD W/O CONTRAST  LOCATION: [facility]  DATE/TIME: 1/16/2023 9:21 PM    INDICATION: headache  COMPARISON: None.  TECHNIQUE: Routine CT Head without IV contrast. Multiplanar reformats. Dose reduction techniques were used.    FINDINGS:  INTRACRANIAL CONTENTS: No intracranial hemorrhage, extraaxial collection, or mass effect.  No CT evidence of acute infarct. Normal parenchymal attenuation. Normal ventricles and sulci.     VISUALIZED ORBITS/SINUSES/MASTOIDS: No intraorbital abnormality. Moderate mucosal thickening and perhaps some fluid in the sphenoid sinus. Trace mucosal thickening ethmoid air cells. No middle ear or mastoid effusion.    BONES/SOFT TISSUES: No acute abnormality.      Impression    IMPRESSION:  1.  No acute intracranial abnormality.  2.  Moderate mucosal thickening and perhaps a small amount of fluid noted in the  sphenoid sinus with trace mucosal thickening ethmoid air cells. Correlate for sinusitis symptoms.   Creatinine     Status: Abnormal   Result Value Ref Range    Creatinine 0.47 (L) 0.52 - 1.04 mg/dL    GFR Estimate >90 >60 mL/min/1.73m2   hCG qual urine POCT     Status: Normal   Result Value Ref Range    HCG Qual Urine Negative Negative    Internal QC Check POCT Valid Valid    POCT Kit Lot Number 0     POCT Kit Expiration Date 0      Medications   0.9% sodium chloride BOLUS (0 mLs Intravenous Stopped 1/16/23 2100)   ketorolac (TORADOL) injection 30 mg (30 mg Intravenous Given 1/16/23 1947)   metoclopramide (REGLAN) injection 5 mg (5 mg Intravenous Given 1/16/23 1947)   sodium chloride 0.9 % bag 100 mL for CT (80 mLs Intravenous Given 1/16/23 2121)   iopamidol (ISOVUE-370) solution 100 mL (75 mLs Intravenous Given 1/16/23 2105)     Labs Ordered and Resulted from Time of ED Arrival to Time of ED Departure   CREATININE - Abnormal       Result Value    Creatinine 0.47 (*)     GFR Estimate >90     HCG QUALITATIVE URINE POCT - Normal    HCG Qual Urine Negative      Internal QC Check POCT Valid      POCT Kit Lot Number 0      POCT Kit Expiration Date 0     ISTAT CREATININE POCT     CTA Head Neck with Contrast   Final Result   IMPRESSION:    HEAD CTA:    1.  Normal CTA [place].   2.  Partially opacified right sphenoid sinus. Correlate for sphenoid sinusitis.      NECK CTA:   1.  Normal neck CTA.      CTV Head Neck w Contrast   Final Result   IMPRESSION:    HEAD CTV:   1.  Normal intracranial venous structures.   2.  Partially opacified right sphenoid sinus. Correlate for sphenoid sinusitis.      NECK CTV:    1.  Normal neck venous structures.      CT Head w/o Contrast   Final Result   IMPRESSION:   1.  No acute intracranial abnormality.   2.  Moderate mucosal thickening and perhaps a small amount of fluid noted in the sphenoid sinus with trace mucosal thickening ethmoid air cells. Correlate for sinusitis symptoms.              Medical Decision Making  The patient presented with a problem that is an acute health issue posing potential threat to life or bodily function.    The patient's evaluation involved:  review of 1 prior external note(s) (see separate area of note for details)  ordering and review of 3+ test(s) (see separate area of note for details)  review of 2 test result(s) ordered prior to this encounter (see separate area of note for details)    The patient's management involved prescription drug management.      Assessment & Plan    37 year old female with 2-day history of neck pain radiating to her head.  She does receive chiropractic care and occasionally cracks her neck as well.  Additionally, she had recent COVID infection earlier this month.  With her chiropractic care, initial concern for vertebral dissection.  CT angio head and neck performed without evidence for stenosis, occlusion, or dissection.  Additionally, with her history of COVID, there is possibility of cerebral venous thrombosis; however, headache is not the primary complaint.  A CTV was performed and revealed no evidence for cerebral venous thrombosis.  Do not feel that further imaging with MRI is indicated at this time.  She does have evidence for sphenoid mucosal thickening and perhaps some debris as well.  She states that she was treated for a sphenoid sinusitis in August and the symptoms are reminiscent of those that she had when she first had onset of symptoms.  She states that she was treated with a 2-week course of antibiotics as well as prednisone.  At this point, will initiate treatment for sinusitis with cefixime.  She already has follow-up scheduled with ENT.    I have reviewed the nursing notes. I have reviewed the findings, diagnosis, plan and need for follow up with the patient.    Discharge Medication List as of 1/16/2023 10:41 PM      START taking these medications    Details   cefixime (SUPRAX) 400 MG capsule Take 1 capsule (400  mg) by mouth daily for 10 days, Disp-10 capsule, R-0, E-Prescribe             Final diagnoses:   Sphenoid sinusitis, unspecified chronicity   Neck pain   Acute nonintractable headache, unspecified headache type     Chart documentation was completed with Dragon voice-recognition software. Even though reviewed, this chart may still contain some grammatical, spelling, and word errors.       Chele Erickson Md  [facility] EMERGENCY DEPARTMENT  1/16/2023     Chele Erickson MD  01/16/23 [id number]

## 2023-03-17 LAB — PHQ9 SCORE: 4

## 2023-03-23 ENCOUNTER — ANCILLARY PROCEDURE (OUTPATIENT)
Dept: CT IMAGING | Facility: CLINIC | Age: 38
End: 2023-03-23
Attending: NURSE PRACTITIONER
Payer: COMMERCIAL

## 2023-03-23 DIAGNOSIS — R91.1 LUNG NODULE SEEN ON IMAGING STUDY: ICD-10-CM

## 2023-03-23 PROCEDURE — 71250 CT THORAX DX C-: CPT | Mod: GC | Performed by: RADIOLOGY

## 2023-04-15 ENCOUNTER — HEALTH MAINTENANCE LETTER (OUTPATIENT)
Age: 38
End: 2023-04-15

## 2023-05-18 ENCOUNTER — TRANSFERRED RECORDS (OUTPATIENT)
Dept: HEALTH INFORMATION MANAGEMENT | Facility: CLINIC | Age: 38
End: 2023-05-18

## 2023-11-25 ENCOUNTER — HEALTH MAINTENANCE LETTER (OUTPATIENT)
Age: 38
End: 2023-11-25

## 2023-12-11 ENCOUNTER — OFFICE VISIT (OUTPATIENT)
Dept: URGENT CARE | Facility: URGENT CARE | Age: 38
End: 2023-12-11
Payer: COMMERCIAL

## 2023-12-11 VITALS
HEART RATE: 98 BPM | OXYGEN SATURATION: 98 % | RESPIRATION RATE: 16 BRPM | DIASTOLIC BLOOD PRESSURE: 80 MMHG | SYSTOLIC BLOOD PRESSURE: 140 MMHG | TEMPERATURE: 98.8 F

## 2023-12-11 DIAGNOSIS — H69.93 DYSFUNCTION OF BOTH EUSTACHIAN TUBES: ICD-10-CM

## 2023-12-11 DIAGNOSIS — R07.0 THROAT PAIN: Primary | ICD-10-CM

## 2023-12-11 DIAGNOSIS — H92.02 LEFT EAR PAIN: ICD-10-CM

## 2023-12-11 DIAGNOSIS — L04.9 LYMPHADENITIS, ACUTE: ICD-10-CM

## 2023-12-11 LAB — DEPRECATED S PYO AG THROAT QL EIA: NEGATIVE

## 2023-12-11 PROCEDURE — 87651 STREP A DNA AMP PROBE: CPT | Performed by: PHYSICIAN ASSISTANT

## 2023-12-11 PROCEDURE — 99203 OFFICE O/P NEW LOW 30 MIN: CPT | Performed by: PHYSICIAN ASSISTANT

## 2023-12-11 RX ORDER — ATORVASTATIN CALCIUM 40 MG/1
40 TABLET, FILM COATED ORAL DAILY
COMMUNITY
End: 2024-08-22

## 2023-12-11 RX ORDER — CEFDINIR 300 MG/1
300 CAPSULE ORAL 2 TIMES DAILY
Qty: 20 CAPSULE | Refills: 0 | Status: SHIPPED | OUTPATIENT
Start: 2023-12-11 | End: 2024-02-26

## 2023-12-11 RX ORDER — SEMAGLUTIDE 1.34 MG/ML
1 INJECTION, SOLUTION SUBCUTANEOUS
COMMUNITY
Start: 2022-11-02 | End: 2024-02-26

## 2023-12-11 RX ORDER — FAMOTIDINE 40 MG/1
1 TABLET, FILM COATED ORAL DAILY
COMMUNITY
Start: 2023-10-23 | End: 2024-02-26

## 2023-12-11 RX ORDER — FLUTICASONE PROPIONATE 50 MCG
1 SPRAY, SUSPENSION (ML) NASAL DAILY
Qty: 16 G | Refills: 0 | Status: SHIPPED | OUTPATIENT
Start: 2023-12-11

## 2023-12-11 RX ORDER — CETIRIZINE HYDROCHLORIDE, PSEUDOEPHEDRINE HYDROCHLORIDE 5; 120 MG/1; MG/1
1 TABLET, FILM COATED, EXTENDED RELEASE ORAL 2 TIMES DAILY
Qty: 20 TABLET | Refills: 0 | Status: SHIPPED | OUTPATIENT
Start: 2023-12-11 | End: 2024-02-26

## 2023-12-11 RX ORDER — IBUPROFEN 600 MG/1
600 TABLET, FILM COATED ORAL EVERY 6 HOURS PRN
Qty: 30 TABLET | Refills: 0 | Status: SHIPPED | OUTPATIENT
Start: 2023-12-11 | End: 2024-12-10

## 2023-12-12 LAB — GROUP A STREP BY PCR: NOT DETECTED

## 2023-12-12 NOTE — PROGRESS NOTES
Assessment & Plan     Throat pain    You had a strep test done today that was neg.  We will perform a strep culture and if any positive results come back we will call you and call in antibiotics.  At this time, this appears to be a viral infection and requires symptomatic treatment.  Most viral sore throats will resolve with in a few days.  If your throat pain worsens then please be  rechecked in the UC or by your PCP.    Strep neg, culture pending  - Streptococcus A Rapid Screen w/Reflex to PCR  - Group A Streptococcus PCR Throat Swab    Lymphadenitis, acute    Due to significant lymph node swelling and tenderness left side only there is concern for left side throat pain and swollen glands  Start on medications    - Streptococcus A Rapid Screen w/Reflex to PCR  - Group A Streptococcus PCR Throat Swab  - cefdinir (OMNICEF) 300 MG capsule  Dispense: 20 capsule; Refill: 0    Dysfunction of both eustachian tubes    Start on flonase and Zyrtec D for congestion and ETD  - fluticasone (FLONASE) 50 MCG/ACT nasal spray  Dispense: 16 g; Refill: 0  - cetirizine-pseudoePHEDrine ER (ZYRTEC-D) 5-120 MG 12 hr tablet  Dispense: 20 tablet; Refill: 0    Left ear pain    Motrin for ear pain  Continue flonase  - ibuprofen (ADVIL/MOTRIN) 600 MG tablet  Dispense: 30 tablet; Refill: 0      Review of external notes as documented elsewhere in note    At today's visit with Isabel Reeder , we discussed results, diagnosis, medications and formulated a plan.  We also discussed red flags for immediate return to clinic/ER, as well as indications for follow up with PCP if not improved in 3 days. Patient understood and agreed to plan. Isabel Reeder was discharged with stable vitals and has no further questions.       No follow-ups on file.    Josr Sanchez, [facility], PA-TREMAYNE  [facility] URGENT [facility]    Tania Hall is a 38 year old, presenting for the following health issues:  Otalgia (L ear pain, throat, side of face X 2  days-recently had acupuncture for sinuses )      HPI   Review of Systems   Constitutional, HEENT, cardiovascular, pulmonary, GI, , musculoskeletal, neuro, skin, endocrine and psych systems are negative, except as otherwise noted.      Objective    BP (!) 140/80   Pulse 98   Temp 98.8  F (37.1  C) (Tympanic)   Resp 16   SpO2 98%   There is no height or weight on file to calculate BMI.  Physical Exam   GENERAL: healthy, alert and no distress  EYES: Eyes grossly normal to inspection, PERRL and conjunctivae and sclerae normal  HENT: ear canals and TM's normal, nose and mouth without ulcers or lesions  NECK: no adenopathy, no asymmetry, masses, or scars and thyroid normal to palpation  RESP: lungs clear to auscultation - no rales, rhonchi or wheezes  CV: regular rate and rhythm, normal S1 S2, no S3 or S4, no murmur, click or rub, no peripheral edema and peripheral pulses strong  MS: no gross musculoskeletal defects noted, no edema  SKIN: no suspicious lesions or rashes  NEURO: Normal strength and tone, mentation intact and speech normal  PSYCH: mentation appears normal, affect normal/bright        Results for orders placed or performed in visit on 12/11/23   Streptococcus A Rapid Screen w/Reflex to PCR     Status: Normal    Specimen: Throat; Swab   Result Value Ref Range    Group A Strep antigen Negative Negative

## 2024-02-26 ENCOUNTER — OFFICE VISIT (OUTPATIENT)
Dept: FAMILY MEDICINE | Facility: CLINIC | Age: 39
End: 2024-02-26
Payer: COMMERCIAL

## 2024-02-26 VITALS
DIASTOLIC BLOOD PRESSURE: 86 MMHG | TEMPERATURE: 98 F | OXYGEN SATURATION: 100 % | HEART RATE: 100 BPM | HEIGHT: 59 IN | WEIGHT: 185.5 LBS | SYSTOLIC BLOOD PRESSURE: 130 MMHG | RESPIRATION RATE: 16 BRPM | BODY MASS INDEX: 37.4 KG/M2

## 2024-02-26 DIAGNOSIS — E66.01 CLASS 2 SEVERE OBESITY DUE TO EXCESS CALORIES WITH SERIOUS COMORBIDITY AND BODY MASS INDEX (BMI) OF 37.0 TO 37.9 IN ADULT (H): ICD-10-CM

## 2024-02-26 DIAGNOSIS — E66.812 CLASS 2 SEVERE OBESITY DUE TO EXCESS CALORIES WITH SERIOUS COMORBIDITY AND BODY MASS INDEX (BMI) OF 37.0 TO 37.9 IN ADULT (H): ICD-10-CM

## 2024-02-26 DIAGNOSIS — Z11.59 NEED FOR HEPATITIS C SCREENING TEST: ICD-10-CM

## 2024-02-26 DIAGNOSIS — E11.9 TYPE 2 DIABETES, HBA1C GOAL < 7% (H): Primary | ICD-10-CM

## 2024-02-26 LAB
ALBUMIN SERPL BCG-MCNC: 4.5 G/DL (ref 3.5–5.2)
ALP SERPL-CCNC: 80 U/L (ref 40–150)
ALT SERPL W P-5'-P-CCNC: 26 U/L (ref 0–50)
ANION GAP SERPL CALCULATED.3IONS-SCNC: 10 MMOL/L (ref 7–15)
AST SERPL W P-5'-P-CCNC: 23 U/L (ref 0–45)
BILIRUB DIRECT SERPL-MCNC: <0.2 MG/DL (ref 0–0.3)
BILIRUB SERPL-MCNC: 0.7 MG/DL
BUN SERPL-MCNC: 7.3 MG/DL (ref 6–20)
CALCIUM SERPL-MCNC: 9.7 MG/DL (ref 8.6–10)
CHLORIDE SERPL-SCNC: 100 MMOL/L (ref 98–107)
CHOLEST SERPL-MCNC: 188 MG/DL
CREAT SERPL-MCNC: 0.41 MG/DL (ref 0.51–0.95)
CREAT UR-MCNC: 26.5 MG/DL
DEPRECATED HCO3 PLAS-SCNC: 28 MMOL/L (ref 22–29)
EGFRCR SERPLBLD CKD-EPI 2021: >90 ML/MIN/1.73M2
ERYTHROCYTE [DISTWIDTH] IN BLOOD BY AUTOMATED COUNT: 11.8 % (ref 10–15)
FASTING STATUS PATIENT QL REPORTED: YES
GLUCOSE SERPL-MCNC: 117 MG/DL (ref 70–99)
HBA1C MFR BLD: 8.3 % (ref 0–5.6)
HCT VFR BLD AUTO: 43.8 % (ref 35–47)
HCV AB SERPL QL IA: NONREACTIVE
HDLC SERPL-MCNC: 42 MG/DL
HGB BLD-MCNC: 15 G/DL (ref 11.7–15.7)
HOLD SPECIMEN: NORMAL
LDLC SERPL CALC-MCNC: 107 MG/DL
MCH RBC QN AUTO: 27.8 PG (ref 26.5–33)
MCHC RBC AUTO-ENTMCNC: 34.2 G/DL (ref 31.5–36.5)
MCV RBC AUTO: 81 FL (ref 78–100)
MICROALBUMIN UR-MCNC: <12 MG/L
MICROALBUMIN/CREAT UR: NORMAL MG/G{CREAT}
NONHDLC SERPL-MCNC: 146 MG/DL
PLATELET # BLD AUTO: 274 10E3/UL (ref 150–450)
POTASSIUM SERPL-SCNC: 4.1 MMOL/L (ref 3.4–5.3)
PROT SERPL-MCNC: 7.5 G/DL (ref 6.4–8.3)
RBC # BLD AUTO: 5.39 10E6/UL (ref 3.8–5.2)
SODIUM SERPL-SCNC: 138 MMOL/L (ref 135–145)
TRIGL SERPL-MCNC: 197 MG/DL
WBC # BLD AUTO: 8.3 10E3/UL (ref 4–11)

## 2024-02-26 PROCEDURE — 82248 BILIRUBIN DIRECT: CPT | Performed by: FAMILY MEDICINE

## 2024-02-26 PROCEDURE — 82043 UR ALBUMIN QUANTITATIVE: CPT | Performed by: FAMILY MEDICINE

## 2024-02-26 PROCEDURE — 36415 COLL VENOUS BLD VENIPUNCTURE: CPT | Performed by: FAMILY MEDICINE

## 2024-02-26 PROCEDURE — 86803 HEPATITIS C AB TEST: CPT | Performed by: FAMILY MEDICINE

## 2024-02-26 PROCEDURE — 80053 COMPREHEN METABOLIC PANEL: CPT | Performed by: FAMILY MEDICINE

## 2024-02-26 PROCEDURE — 80061 LIPID PANEL: CPT | Performed by: FAMILY MEDICINE

## 2024-02-26 PROCEDURE — 82570 ASSAY OF URINE CREATININE: CPT | Performed by: FAMILY MEDICINE

## 2024-02-26 PROCEDURE — 83036 HEMOGLOBIN GLYCOSYLATED A1C: CPT | Performed by: FAMILY MEDICINE

## 2024-02-26 PROCEDURE — 85027 COMPLETE CBC AUTOMATED: CPT | Performed by: FAMILY MEDICINE

## 2024-02-26 PROCEDURE — 99204 OFFICE O/P NEW MOD 45 MIN: CPT | Performed by: FAMILY MEDICINE

## 2024-02-26 RX ORDER — SEMAGLUTIDE 1.34 MG/ML
1 INJECTION, SOLUTION SUBCUTANEOUS
Qty: 3 ML | Refills: 4 | Status: SHIPPED | OUTPATIENT
Start: 2024-02-26 | End: 2024-04-12

## 2024-02-26 RX ORDER — DAPAGLIFLOZIN 5 MG/1
5 TABLET, FILM COATED ORAL DAILY
COMMUNITY
End: 2024-02-26

## 2024-02-26 RX ORDER — DAPAGLIFLOZIN 10 MG/1
10 TABLET, FILM COATED ORAL DAILY
Qty: 90 TABLET | Refills: 1 | Status: SHIPPED | OUTPATIENT
Start: 2024-02-26

## 2024-02-26 ASSESSMENT — PAIN SCALES - GENERAL: PAINLEVEL: NO PAIN (0)

## 2024-02-26 NOTE — PROGRESS NOTES
Assessment & Plan     Isabel was seen today for establish care, diabetes and hyperlipidemia.    Diagnoses and all orders for this visit:    Type 2 diabetes, HbA1c goal < 7% (H)  Assessment: 38 year old female for follow up of diabetes. Acute symptoms: none.  Diabetic Review of Systems - Medication compliance:  compliant most of the time, Diabetic diet compliance:  compliant most of the time, Diabetic ROS: no polyuria or polydipsia, no chest pain, dyspnea or TIA's, no numbness, tingling or pain in extremities.  Patient had an elevated hemoglobin A1c at 8.6%.  We increased her dose of Farxiga from 5 mg to 10 mg once daily.  PLAN: Per [facility] orders. Issues reviewed with her: .  -     Hemoglobin A1c; Future  -     CBC with Platelets and Reflex to Iron Studies; Future  -     Lipid panel reflex to direct LDL Fasting; Future  -     Basic metabolic panel  (Ca, Cl, CO2, Creat, Gluc, K, Na, BUN); Future  -     Adult Eye  Referral; Future  -     Albumin Random Urine Quantitative with Creat Ratio; Future  -     Hepatic panel (Albumin, ALT, AST, Bili, Alk Phos, TP); Future  -     dapagliflozin (FARXIGA) 10 MG TABS tablet; Take 1 tablet (10 mg) by mouth daily  -     blood glucose (ACCU-CHEK TASH) test strip; 1 strip by In Vitro route 2 times daily Test as directed  -     OZEMPIC, 1 MG/DOSE, 4 MG/3ML pen; Inject 1 mg Subcutaneous every 7 days    Need for hepatitis C screening test  -     Hepatitis C Screen Reflex to HCV RNA Quant and Genotype; Future    Class 2 severe obesity due to excess calories with serious comorbidity and body mass index (BMI) of 37.0 to 37.9 in adult (H)  Weight loss recommended    Other orders  -     REVIEW OF HEALTH MAINTENANCE PROTOCOL ORDERS    Return in about 2 weeks (around 3/11/2024) for Annual Physical Exam.   Patient has an upcoming appointment in 2 weeks for a physical      Subjective   Isabel is a 38 year old, presenting for the following health issues:  Establish Care, Diabetes, and  "Hyperlipidemia        2/26/2024     1:57 PM   Additional Questions   Roomed by Flora WEAVER   Accompanied by n/a     Via the Health Maintenance questionnaire, the patient has reported the following services have been completed -Cervical Cancer Screening, this information has been sent to the abstraction team.  History of Present Illness       Diabetes:   She presents for follow up of diabetes.  She is checking home blood glucose a few times a month.   She checks blood glucose at bedtime.  Blood glucose is sometimes over 200 and never under 70. She is aware of hypoglycemia symptoms including shakiness and weakness.    She has no concerns regarding her diabetes at this time.   She is not experiencing numbness or burning in feet, excessive thirst, blurry vision, weight changes or redness, sores or blisters on feet. The patient has not had a diabetic eye exam in the last 12 months.          Hyperlipidemia:  She presents for follow up of hyperlipidemia.   She is taking medication to lower cholesterol. She is having myalgia or other side effects to statin medications.    She eats 2-3 servings of fruits and vegetables daily.She consumes 1 sweetened beverage(s) daily.She exercises with enough effort to increase her heart rate 30 to 60 minutes per day.  She exercises with enough effort to increase her heart rate 4 days per week. She is missing 1 dose(s) of medications per week.     Patient had two eye exams in the last year- she had eye dryness at that time. [facility]. Due for an eye exam soon.       Review of Systems  Constitutional, HEENT, cardiovascular, pulmonary, gi and gu systems are negative, except as otherwise noted.      Objective    /86 (BP Location: Left arm, Patient Position: Sitting, Cuff Size: Adult Large)   Pulse 100   Temp 98  F (36.7  C) (Temporal)   Resp 16   Ht 1.505 m (4' 11.25\")   Wt 84.1 kg (185 lb 8 oz)   LMP 02/22/2024 (Exact Date)   SpO2 100%   BMI 37.15 " kg/m    Body mass index is 37.15 kg/m .  Physical Exam   GENERAL: alert and no distress  RESP: lungs clear to auscultation - no rales, rhonchi or wheezes  CV: regular rate and rhythm, normal S1 S2, no S3 or S4, no murmur, click or rub, no peripheral edema  ABDOMEN: soft, nontender, no hepatosplenomegaly, no masses and bowel sounds normal  MS: no gross musculoskeletal defects noted, no edema  Diabetic foot exam: normal DP and PT pulses, no trophic changes or ulcerative lesions, and normal sensory exam.  Patient has onychomycosis of the first and the fifth digit bilaterally    Results for orders placed or performed in visit on 02/26/24   Hemoglobin A1c     Status: Abnormal   Result Value Ref Range    Hemoglobin A1C 8.3 (H) 0.0 - 5.6 %    Narrative    Results confirmed by repeat test.    CBC with Platelets and Reflex to Iron Studies     Status: Abnormal   Result Value Ref Range    WBC Count 8.3 4.0 - 11.0 10e3/uL    RBC Count 5.39 (H) 3.80 - 5.20 10e6/uL    Hemoglobin 15.0 11.7 - 15.7 g/dL    Hematocrit 43.8 35.0 - 47.0 %    MCV 81 78 - 100 fL    MCH 27.8 26.5 - 33.0 pg    MCHC 34.2 31.5 - 36.5 g/dL    RDW 11.8 10.0 - 15.0 %    Platelet Count 274 150 - 450 10e3/uL   Extra Green Top (Lithium Heparin) Tube     Status: None   Result Value Ref Range    Hold Specimen JIC    CBC with Platelets and Reflex to Iron Studies     Status: Abnormal    Narrative    The following orders were created for panel order CBC with Platelets and Reflex to Iron Studies.  Procedure                               Abnormality         Status                     ---------                               -----------         ------                     CBC with Platelets and R...[[id number]]  Abnormal            Final result               Extra Green Top (Lithium...[[id number]]                      Final result                 Please view results for these tests on the individual orders.           Signed Electronically by: Neal Harris MD

## 2024-02-29 NOTE — RESULT ENCOUNTER NOTE
Elevated hemoglobin A1c was discussed during our clinic visit.  Advised patient to increase her dose of Farxiga from 5 mg to 10 mg  Dear Isabel,   - Your cholesterol panel is mildly elevated.  Continue with your current dose of atorvastatin.  Decrease your dietary intake of simple carbohydrates and fats.  We will recheck your cholesterol level during your upcoming visit    Best Regards  Neal Harris MD

## 2024-03-05 ENCOUNTER — ALLIED HEALTH/NURSE VISIT (OUTPATIENT)
Dept: FAMILY MEDICINE | Facility: CLINIC | Age: 39
End: 2024-03-05
Payer: COMMERCIAL

## 2024-03-05 DIAGNOSIS — Z23 ENCOUNTER FOR IMMUNIZATION: Primary | ICD-10-CM

## 2024-03-05 PROCEDURE — 90480 ADMN SARSCOV2 VAC 1/ONLY CMP: CPT

## 2024-03-05 PROCEDURE — 91320 SARSCV2 VAC 30MCG TRS-SUC IM: CPT

## 2024-03-05 PROCEDURE — 90471 IMMUNIZATION ADMIN: CPT

## 2024-03-05 PROCEDURE — 90677 PCV20 VACCINE IM: CPT

## 2024-03-05 PROCEDURE — 99207 PR NO CHARGE NURSE ONLY: CPT

## 2024-03-05 NOTE — PROGRESS NOTES
Prior to immunization administration, verified patients identity using patient s name and date of birth. Please see Immunization Activity for additional information.     Screening Questionnaire for Adult Immunization    Are you sick today?   No   Do you have allergies to medications, food, a vaccine component or latex?   No   Have you ever had a serious reaction after receiving a vaccination?   No   Do you have a long-term health problem with heart, lung, kidney, or metabolic disease (e.g., diabetes), asthma, a blood disorder, no spleen, complement component deficiency, a cochlear implant, or a spinal fluid leak?  Are you on long-term aspirin therapy?   Yes   Do you have cancer, leukemia, HIV/AIDS, or any other immune system problem?   No   Do you have a parent, brother, or sister with an immune system problem?   No   In the past 3 months, have you taken medications that affect  your immune system, such as prednisone, other steroids, or anticancer drugs; drugs for the treatment of rheumatoid arthritis, Crohn s disease, or psoriasis; or have you had radiation treatments?   No   Have you had a seizure, or a brain or other nervous system problem?   No   During the past year, have you received a transfusion of blood or blood    products, or been given immune (gamma) globulin or antiviral drug?   No   For women: Are you pregnant or is there a chance you could become       pregnant during the next month?   No   Have you received any vaccinations in the past 4 weeks?   No     Immunization questionnaire was positive for at least one answer.  Notified Dr Harris.    I have reviewed the following standing orders:   This patient is due and qualifies for the Covid-19 vaccine.     Click here for COVID-19 Standing Order    I have reviewed the vaccines inclusion and exclusion criteria; No concerns regarding eligibility.   This patient is due and qualifies for the Pneumococcal vaccine.    Click here for Pneumococcal (Adult) Standing  Order    I have reviewed the vaccines inclusion and exclusion criteria;No concerns regarding eligibility.     Patient instructed to remain in clinic for 15 minutes afterwards, and to report any adverse reactions.     Screening performed by Rose Mary Walker RN on 3/5/2024 at 8:57 AM.

## 2024-03-25 ENCOUNTER — TELEPHONE (OUTPATIENT)
Dept: OPHTHALMOLOGY | Facility: CLINIC | Age: 39
End: 2024-03-25
Payer: COMMERCIAL

## 2024-03-25 NOTE — TELEPHONE ENCOUNTER
Spoke with patient and rescheduled for 4/5/24 in morning [facility] Spot due to spouse cancelled appointment by mistake. [facility]

## 2024-04-05 ENCOUNTER — OFFICE VISIT (OUTPATIENT)
Dept: OPHTHALMOLOGY | Facility: CLINIC | Age: 39
End: 2024-04-05
Payer: COMMERCIAL

## 2024-04-05 DIAGNOSIS — E11.3293 TYPE 2 DIABETES MELLITUS WITH BOTH EYES AFFECTED BY MILD NONPROLIFERATIVE RETINOPATHY WITHOUT MACULAR EDEMA, WITHOUT LONG-TERM CURRENT USE OF INSULIN (H): Primary | ICD-10-CM

## 2024-04-05 DIAGNOSIS — H52.31 ANISOMETROPIA: ICD-10-CM

## 2024-04-05 DIAGNOSIS — H53.022 REFRACTIVE AMBLYOPIA OF LEFT EYE: ICD-10-CM

## 2024-04-05 DIAGNOSIS — H40.053 BORDERLINE GLAUCOMA OF BOTH EYES WITH OCULAR HYPERTENSION: ICD-10-CM

## 2024-04-05 PROCEDURE — 92004 COMPRE OPH EXAM NEW PT 1/>: CPT | Performed by: OPTOMETRIST

## 2024-04-05 PROCEDURE — 92133 CPTRZD OPH DX IMG PST SGM ON: CPT | Performed by: OPTOMETRIST

## 2024-04-05 ASSESSMENT — CONF VISUAL FIELD
OD_SUPERIOR_NASAL_RESTRICTION: 0
OS_SUPERIOR_NASAL_RESTRICTION: 0
OD_NORMAL: 1
OD_INFERIOR_NASAL_RESTRICTION: 0
OS_INFERIOR_NASAL_RESTRICTION: 0
OS_INFERIOR_TEMPORAL_RESTRICTION: 0
OS_NORMAL: 1
METHOD: COUNTING FINGERS
OD_INFERIOR_TEMPORAL_RESTRICTION: 0
OS_SUPERIOR_TEMPORAL_RESTRICTION: 0
OD_SUPERIOR_TEMPORAL_RESTRICTION: 0

## 2024-04-05 ASSESSMENT — VISUAL ACUITY
OS_PH_SC+: -1
OD_SC: 20/20
OD_SC+: -2
OS_SC: J5-2
METHOD: SNELLEN - LINEAR
OS_PH_SC: 20/40
OS_SC: 20/80
OD_SC: J1+

## 2024-04-05 ASSESSMENT — REFRACTION_MANIFEST
OS_CYLINDER: +4.00
OD_SPHERE: PLANO
OS_AXIS: 078
OD_CYLINDER: +0.25
OS_SPHERE: -1.00
OD_AXIS: 165

## 2024-04-05 ASSESSMENT — TONOMETRY
IOP_METHOD: ICARE
OD_IOP_MMHG: 26
OS_IOP_MMHG: 26
IOP_METHOD: TONOPEN
OD_IOP_MMHG: 29
OS_IOP_MMHG: 26

## 2024-04-05 ASSESSMENT — CUP TO DISC RATIO
OS_RATIO: 0.55
OD_RATIO: 0.55

## 2024-04-05 ASSESSMENT — PACHYMETRY
OS_CT(UM): 595
OD_CT(UM): 584

## 2024-04-05 ASSESSMENT — SLIT LAMP EXAM - LIDS
COMMENTS: NORMAL
COMMENTS: NORMAL

## 2024-04-05 NOTE — PROGRESS NOTES
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 A1c 8.3  -Reviewed effects of DM on the eyes and importance of good blood sugar control  -Monitor annually with dilation    2.) Ocular Hypertension  -IOP 26/26 today with thick pachy 584/595  -RNFL normal OU. No Family Hx  -Recheck 6 months IOP check    3.) Anisometropic amblyopia left eye  -BCVA 20/25 left eye but with high aniso/cyl  -Per pt since childhood. Does not tolerate in glasses  -Reviewed option of CL if desired - she will consider    RTC 6 months IOP, RNFL and baseline VF    I have confirmed the patient's CC, HPI and reviewed Past Medical History, Past Surgical History, Social History, Family History, Problem List, Medication List and agree with Tech note.     Odalis Darling, LAURA MOISEO ZENONS

## 2024-04-05 NOTE — NURSING NOTE
Chief Complaints and History of Present Illnesses   Patient presents with    COMPREHENSIVE EYE EXAM     Comprehensive exam/ Diabetic check by request of Dr. Harris       Chief Complaint(s) and History of Present Illness(es)       COMPREHENSIVE EYE EXAM              Laterality: both eyes    Associated symptoms: dryness    Treatments tried: artificial tears    Pain scale: 0/10    Comments: Comprehensive exam/ Diabetic check by request of Dr. Harris                Comments    Lab Results       Component                Value               Date                       A1C                      8.3                 02/26/2024                 A1C                      6.6                 08/12/2010                 A1C                      7.6                 01/05/2010                 A1C                      6.8                 09/29/2009                 A1C                      6.0                 11/21/2008                 A1C                      12.4                07/25/2008            Some dryness with each eye. History with lazy left eye. Patching was done but not very consistent about doing so. Left eye has never been a strong as right eye.   Denies any double vision.   Feels like NVA may be more difficult the past few months.     Stacey Simon, COT COT 9:04 AM April 5, 2024

## 2024-04-12 ENCOUNTER — OFFICE VISIT (OUTPATIENT)
Dept: FAMILY MEDICINE | Facility: CLINIC | Age: 39
End: 2024-04-12
Payer: COMMERCIAL

## 2024-04-12 VITALS
HEIGHT: 64 IN | SYSTOLIC BLOOD PRESSURE: 132 MMHG | BODY MASS INDEX: 31.92 KG/M2 | DIASTOLIC BLOOD PRESSURE: 88 MMHG | HEART RATE: 96 BPM | WEIGHT: 187 LBS | TEMPERATURE: 97.3 F | RESPIRATION RATE: 16 BRPM | OXYGEN SATURATION: 98 %

## 2024-04-12 DIAGNOSIS — R91.8 PULMONARY NODULES: ICD-10-CM

## 2024-04-12 DIAGNOSIS — Z80.0 FAMILY HISTORY OF COLON CANCER: ICD-10-CM

## 2024-04-12 DIAGNOSIS — Z00.00 ROUTINE GENERAL MEDICAL EXAMINATION AT A HEALTH CARE FACILITY: Primary | ICD-10-CM

## 2024-04-12 DIAGNOSIS — Z12.31 VISIT FOR SCREENING MAMMOGRAM: ICD-10-CM

## 2024-04-12 DIAGNOSIS — E11.9 TYPE 2 DIABETES, HBA1C GOAL < 7% (H): ICD-10-CM

## 2024-04-12 LAB
CHOLEST SERPL-MCNC: 167 MG/DL
FASTING STATUS PATIENT QL REPORTED: YES
HBA1C MFR BLD: 8 % (ref 0–5.6)
HDLC SERPL-MCNC: 39 MG/DL
LDLC SERPL CALC-MCNC: 97 MG/DL
NONHDLC SERPL-MCNC: 128 MG/DL
TRIGL SERPL-MCNC: 153 MG/DL

## 2024-04-12 PROCEDURE — 36415 COLL VENOUS BLD VENIPUNCTURE: CPT | Performed by: FAMILY MEDICINE

## 2024-04-12 PROCEDURE — 99396 PREV VISIT EST AGE 40-64: CPT | Performed by: FAMILY MEDICINE

## 2024-04-12 PROCEDURE — 99214 OFFICE O/P EST MOD 30 MIN: CPT | Mod: 25 | Performed by: FAMILY MEDICINE

## 2024-04-12 PROCEDURE — 83036 HEMOGLOBIN GLYCOSYLATED A1C: CPT | Performed by: FAMILY MEDICINE

## 2024-04-12 PROCEDURE — 80061 LIPID PANEL: CPT | Performed by: FAMILY MEDICINE

## 2024-04-12 SDOH — HEALTH STABILITY: PHYSICAL HEALTH: ON AVERAGE, HOW MANY DAYS PER WEEK DO YOU ENGAGE IN MODERATE TO STRENUOUS EXERCISE (LIKE A BRISK WALK)?: 4 DAYS

## 2024-04-12 ASSESSMENT — PAIN SCALES - GENERAL: PAINLEVEL: NO PAIN (0)

## 2024-04-12 ASSESSMENT — SOCIAL DETERMINANTS OF HEALTH (SDOH): HOW OFTEN DO YOU GET TOGETHER WITH FRIENDS OR RELATIVES?: ONCE A WEEK

## 2024-04-12 NOTE — RESULT ENCOUNTER NOTE
Dear Isabel,   Your hemoglobin A1c continues to be very high.  It is currently a 8.0%.  I increased her dose for Ozempic from 1 mg once a week to 2 mg once a week.  A prescription was sent to your pharmacy on file    Best Regards   Neal Harris MD

## 2024-04-12 NOTE — PATIENT INSTRUCTIONS
Preventive Care Advice   This is general advice given by our system to help you stay healthy. However, your care team may have specific advice just for you. Please talk to your care team about your preventive care needs.  Nutrition  Eat 5 or more servings of fruits and vegetables each day.  Try wheat bread, brown rice and whole grain pasta (instead of white bread, rice, and pasta).  Get enough calcium and vitamin D. Check the label on foods and aim for 100% of the RDA (recommended daily allowance).  Lifestyle  Exercise at least 150 minutes each week   (30 minutes a day, 5 days a week).  Do muscle strengthening activities 2 days a week. These help control your weight and prevent disease.  No smoking.  Wear sunscreen to prevent skin cancer.  Have a dental exam and cleaning every 6 months.  Yearly exams  See your health care team every year to talk about:  Any changes in your health.  Any medicines your care team has prescribed.  Preventive care, family planning, and ways to prevent chronic diseases.  Shots (vaccines)   HPV shots (up to age 26), if you've never had them before.  Hepatitis B shots (up to age 59), if you've never had them before.  COVID-19 shot: Get this shot when it's due.  Flu shot: Get a flu shot every year.  Tetanus shot: Get a tetanus shot every 10 years.  Pneumococcal, hepatitis A, and RSV shots: Ask your care team if you need these based on your risk.  Shingles shot (for age 50 and up).  General health tests  Diabetes screening:  Starting at age 35, Get screened for diabetes at least every 3 years.  If you are younger than age 35, ask your care team if you should be screened for diabetes.  Cholesterol test: At age 39, start having a cholesterol test every 5 years, or more often if advised.  Bone density scan (DEXA): At age 50, ask your care team if you should have this scan for osteoporosis (brittle bones).  Hepatitis C: Get tested at least once in your life.  STIs (sexually transmitted  infections)  Before age 24: Ask your care team if you should be screened for STIs.  After age 24: Get screened for STIs if you're at risk. You are at risk for STIs (including HIV) if:  You are sexually active with more than one person.  You don't use condoms every time.  You or a partner was diagnosed with a sexually transmitted infection.  If you are at risk for HIV, ask about PrEP medicine to prevent HIV.  Get tested for HIV at least once in your life, whether you are at risk for HIV or not.  Cancer screening tests  Cervical cancer screening: If you have a cervix, begin getting regular cervical cancer screening tests at age 21. Most people who have regular screenings with normal results can stop after age 65. Talk about this with your provider.  Breast cancer scan (mammogram): If you've ever had breasts, begin having regular mammograms starting at age 40. This is a scan to check for breast cancer.  Colon cancer screening: It is important to start screening for colon cancer at age 45.  Have a colonoscopy test every 10 years (or more often if you're at risk) Or, ask your provider about stool tests like a FIT test every year or Cologuard test every 3 years.  To learn more about your testing options, visit: https://www.[employer]/812302.pdf.  For help making a decision, visit: https://bit.ly/gl88607.  Prostate cancer screening test: If you have a prostate and are age 55 to 69, ask your provider if you would benefit from a yearly prostate cancer screening test.  Lung cancer screening: If you are a current or former smoker age 50 to 80, ask your care team if ongoing lung cancer screenings are right for you.  For informational purposes only. Not to replace the advice of your health care provider. Copyright   2023 [employer]. All rights reserved. Clinically reviewed by the [facility] Transitions Program. [employer] 122656 - REV 01/24.    Learning About Stress  What is stress?     Stress is your  body's response to a hard situation. Your body can have a physical, emotional, or mental response. Stress is a fact of life for most people, and it affects everyone differently. What causes stress for you may not be stressful for someone else.  A lot of things can cause stress. You may feel stress when you go on a job interview, take a test, or run a race. This kind of short-term stress is normal and even useful. It can help you if you need to work hard or react quickly. For example, stress can help you finish an important job on time.  Long-term stress is caused by ongoing stressful situations or events. Examples of long-term stress include long-term health problems, ongoing problems at work, or conflicts in your family. Long-term stress can harm your health.  How does stress affect your health?  When you are stressed, your body responds as though you are in danger. It makes hormones that speed up your heart, make you breathe faster, and give you a burst of energy. This is called the fight-or-flight stress response. If the stress is over quickly, your body goes back to normal and no harm is done.  But if stress happens too often or lasts too long, it can have bad effects. Long-term stress can make you more likely to get sick, and it can make symptoms of some diseases worse. If you tense up when you are stressed, you may develop neck, shoulder, or low back pain. Stress is linked to high blood pressure and heart disease.  Stress also harms your emotional health. It can make you paris, tense, or depressed. Your relationships may suffer, and you may not do well at work or school.  What can you do to manage stress?  You can try these things to help manage stress:   Do something active. Exercise or activity can help reduce stress. Walking is a great way to get started. Even everyday activities such as housecleaning or yard work can help.  Try yoga or jesus chi. These techniques combine exercise and meditation. You may need  some training at first to learn them.  Do something you enjoy. For example, listen to music or go to a movie. Practice your hobby or do volunteer work.  Meditate. This can help you relax, because you are not worrying about what happened before or what may happen in the future.  Do guided imagery. Imagine yourself in any setting that helps you feel calm. You can use online videos, books, or a teacher to guide you.  Do breathing exercises. For example:  From a standing position, bend forward from the waist with your knees slightly bent. Let your arms dangle close to the floor.  Breathe in slowly and deeply as you return to a standing position. Roll up slowly and lift your head last.  Hold your breath for just a few seconds in the standing position.  Breathe out slowly and bend forward from the waist.  Let your feelings out. Talk, laugh, cry, and express anger when you need to. Talking with supportive friends or family, a counselor, or a gil leader about your feelings is a healthy way to relieve stress. Avoid discussing your feelings with people who make you feel worse.  Write. It may help to write about things that are bothering you. This helps you find out how much stress you feel and what is causing it. When you know this, you can find better ways to cope.  What can you do to prevent stress?  You might try some of these things to help prevent stress:  Manage your time. This helps you find time to do the things you want and need to do.  Get enough sleep. Your body recovers from the stresses of the day while you are sleeping.  Get support. Your family, friends, and community can make a difference in how you experience stress.  Limit your news feed. Avoid or limit time on social media or news that may make you feel stressed.  Do something active. Exercise or activity can help reduce stress. Walking is a great way to get started.  Where can you learn more?  Go to https://www.healthwise.net/patiented  Enter N032 in the  "search box to learn more about \"Learning About Stress.\"  Current as of: October 24, 2023               Content Version: 14.0    3912-9408 [employer].   Care instructions adapted under license by your healthcare professional. If you have questions about a medical condition or this instruction, always ask your healthcare professional. [employer] disclaims any warranty or liability for your use of this information.      "

## 2024-04-12 NOTE — PROGRESS NOTES
Preventive Care Visit  [facility]  Neal Harris MD, Family Medicine  Apr 12, 2024      Assessment & Plan     Isabel was seen today for physical.    Diagnoses and all orders for this visit:    Routine general medical examination at a health care facility  -     ADVANCE CARE PLAN OR EQUIV PRESENT IN MEDICAL RECORD    Type 2 diabetes, HbA1c goal < 7% (H)  Hemoglobin A1c is still elevated at 8.0%.  I increased her Ozempic from 1 mg to 2 mg once a week.  -     Lipid panel reflex to direct LDL Fasting; Future  -     Hemoglobin A1c; Future  -     Semaglutide, 2 MG/DOSE, (OZEMPIC) 8 MG/3ML pen; Inject 2 mg Subcutaneous every 7 days    Visit for screening mammogram  -     MA SCREENING DIGITAL BILAT - Future  (s+30); Future    Pulmonary nodules  -     CT Chest w/o & w Contrast; Future    Family history of colon cancer  -     Colonoscopy Screening  Referral; Future    Other orders  -     PRIMARY CARE FOLLOW-UP SCHEDULING; Future       Follow-up Visit   Expected date:  Apr 12, 2025 (Approximate)      Follow Up Appointment Details:     Follow-up with whom?: PCP    Follow-Up for what?: Adult Preventive    How?: In Person                 Counseling  Appropriate preventive services were discussed with this patient, including applicable screening as appropriate for fall prevention, nutrition, physical activity, Tobacco-use cessation, weight loss and cognition.  Checklist reviewing preventive services available has been given to the patient.  Reviewed patient's diet, addressing concerns and/or questions.   She is at risk for psychosocial distress and has been provided with information to reduce risk.     Subjective   Isabel is a 39 year old, presenting for the following:  Physical        4/12/2024    10:42 AM   Additional Questions   Roomed by felipe skaggs        Health Care Directive  Patient does not have a Health Care Directive or Living Will: Discussed advance care planning with patient;  information given to patient to review.    HPI      4/12/2024   General Health   How would you rate your overall physical health? Good   Feel stress (tense, anxious, or unable to sleep) Only a little   (!) STRESS CONCERN      4/12/2024   Nutrition   Three or more servings of calcium each day? (!) NO   Diet: Diabetic   How many servings of fruit and vegetables per day? (!) 0-1   How many sweetened beverages each day? (!) 2         4/12/2024   Exercise   Days per week of moderate/strenous exercise 4 days         4/12/2024   Social Factors   Frequency of gathering with friends or relatives Once a week   Worry food won't last until get money to buy more No   Food not last or not have enough money for food? No   Do you have housing?  Yes   Are you worried about losing your housing? No   Lack of transportation? No   Unable to get utilities (heat,electricity)? No         4/12/2024   Dental   Dentist two times every year? Yes         4/12/2024   TB Screening   Were you born outside of the [place]? No         Today's PHQ-2 Score:       4/12/2024    10:39 AM   PHQ-2 ( 1999 [employer])   Q1: Little interest or pleasure in doing things 0   Q2: Feeling down, depressed or hopeless 1   PHQ-2 Score 1   Q1: Little interest or pleasure in doing things Not at all   Q2: Feeling down, depressed or hopeless Several days   PHQ-2 Score 1           4/12/2024   Substance Use   Alcohol more than 3/day or more than 7/wk No   Do you use any other substances recreationally? No     Social History     Tobacco Use    Smoking status: Never   Substance Use Topics    Alcohol use: Never    Drug use: No        Mammogram Screening - Mammogram every 1-2 years updated in Health Maintenance based on mutual decision making        4/12/2024   STI Screening   New sexual partner(s) since last STI/HIV test? No     History of abnormal Pap smear: NO - age 30-65 PAP every 5 years with negative HPV co-testing recommended        5/11/2021     9:12 AM 10/6/2009    12:00 AM  "6/25/2008    12:00 AM   PAP / HPV   PAP (Historical)  NIL  NIL    PAP-ABSTRACT See Scanned Document             This result is from an external source.     ASCVD Risk   The 10-year ASCVD risk score (Leana CASTAÑEDA, et al., 2019) is: 1.8%    Values used to calculate the score:      Age: 39 years      Sex: Female      Is Non- : No      Diabetic: Yes      Tobacco smoker: No      Systolic Blood Pressure: 132 mmHg      Is BP treated: No      HDL Cholesterol: 42 mg/dL      Total Cholesterol: 188 mg/dL        4/12/2024   Contraception/Family Planning   Questions about contraception or family planning No        Reviewed and updated as needed this visit by Provider   Tobacco  Allergies  Meds  Problems  Med Hx  Surg Hx  Fam Hx              Review of Systems  Constitutional, neuro, ENT, endocrine, pulmonary, cardiac, gastrointestinal, genitourinary, musculoskeletal, integument and psychiatric systems are negative, except as otherwise noted.     Objective    Exam  /88   Pulse 96   Temp 97.3  F (36.3  C) (Temporal)   Resp 16   Ht 1.613 m (5' 3.5\")   Wt 84.8 kg (187 lb)   LMP 03/18/2024 (Approximate)   SpO2 98%   BMI 32.61 kg/m     Estimated body mass index is 32.61 kg/m  as calculated from the following:    Height as of this encounter: 1.613 m (5' 3.5\").    Weight as of this encounter: 84.8 kg (187 lb).    Physical Exam  GENERAL: alert and no distress  EYES: Eyes grossly normal to inspection, PERRL and conjunctivae and sclerae normal  HENT: ear canals and TM's normal, nose and mouth without ulcers or lesions  NECK: no adenopathy, no asymmetry, masses, or scars  RESP: lungs clear to auscultation - no rales, rhonchi or wheezes  CV: regular rate and rhythm, normal S1 S2, no S3 or S4, no murmur, click or rub, no peripheral edema  ABDOMEN: soft, nontender, no hepatosplenomegaly, no masses and bowel sounds normal  MS: no gross musculoskeletal defects noted, no edema  SKIN: no suspicious " lesions or rashes  NEURO: Normal strength and tone, mentation intact and speech normal  PSYCH: mentation appears normal, affect normal/bright    EXAMINATION: Chest CT  3/23/2023 3:04 PM     CLINICAL HISTORY: Lung nodule seen on imaging study     COMPARISON: CT 1/26/2022     TECHNIQUE: CT imaging obtained through the chest without contrast.  Axial, coronal, and sagittal reconstructions and axial MIP reformatted  images are reviewed.      FINDINGS:     Devices: None.     Lower neck and axillae: No enlarged supraclavicular nodes are present.  No actionable nodule is present in the imaged portion of the thyroid  lobes. No axillary lymphadenopathy.     Heart: Normal heart size. No pericardial fluid or thickening.     Mediastinum and florentino: No mediastinal mass is present. Multiple  prominent but nonenlarged by size criteria mediastinal lymph nodes.  Esophagus unremarkable.     Vessels: Normal caliber of the aorta and main pulmonary artery. No  significant atherosclerotic disease.     Airways: The central tracheobronchial tree is clear.     Lungs: No focal consolidation. No pleural effusion or pneumothorax.  Minimal linear atelectasis/scarring in the superior right lower lobe.  Stable scattered sub-6 mm solid pulmonary nodules including a 2 mm  nodule in the right middle lobe (series 4, image 120), a 3 mm nodule  in the peripheral left lower lobe (4, image 191), and a 3 mm nodule in  the left lower lobe (series 4, image 192). No definite new or  enlarging pulmonary nodule identified.     Upper abdomen: No pathologic process is present in the imaged portion  of the upper abdomen.     Bones: No acute or aggressive osseous abnormality. Mild degenerative  changes throughout the visualized spine.     Soft tissues: Unremarkable.                                                                      IMPRESSION:   Stable scattered sub-6 mm solid pulmonary nodule. No definite  enlarging pulmonary nodules identified.      I have  personally reviewed the examination and initial interpretation  and I agree with the findings.     MARY CAPONE MD     Signed Electronically by: Neal Harris MD

## 2024-04-13 ENCOUNTER — HEALTH MAINTENANCE LETTER (OUTPATIENT)
Age: 39
End: 2024-04-13

## 2024-04-24 ENCOUNTER — TELEPHONE (OUTPATIENT)
Dept: GASTROENTEROLOGY | Facility: CLINIC | Age: 39
End: 2024-04-24
Payer: COMMERCIAL

## 2024-04-24 DIAGNOSIS — Z80.0 FAMILY HISTORY OF COLON CANCER: Primary | ICD-10-CM

## 2024-04-24 NOTE — TELEPHONE ENCOUNTER
"Endoscopy Scheduling Screen    Have you had a positive Covid test in the last 14 days?  No      What is your communication preference for Instructions and/or Bowel Prep?   MyChart      What insurance is in the chart?  Other:  bcbs bp    Ordering/Referring Provider: JOHN PAUL LUKE   (If ordering provider performs procedure, schedule with ordering provider unless otherwise instructed. )    BMI: Estimated body mass index is 32.61 kg/m  as calculated from the following:    Height as of 4/12/24: 1.613 m (5' 3.5\").    Weight as of 4/12/24: 84.8 kg (187 lb).     Sedation Ordered  moderate sedation.   If patient BMI > 50 do not schedule in [facility].    If patient BMI > 45 do not schedule at [facility].    Are you taking methadone or Suboxone?  No    Have you had difficulties, pain, or discomfort during past endoscopy procedures?  No    Are you taking any prescription medications for pain 3 or more times per week?   NO, No RN review required.      Do you have a history of malignant hyperthermia?  No      (Females) Are you currently pregnant?   No       Have you been diagnosed or told you have pulmonary hypertension?   No      Do you have an LVAD?  No      Have you been told you have moderate to severe sleep apnea?  No    Have you been told you have COPD, asthma, or any other lung disease?  No      Do you have any heart conditions?  No       Have you ever had or are you waiting for an organ transplant?  No. Continue scheduling, no site restrictions.      Have you had a stroke or transient ischemic attack (TIA aka \"mini stroke\" in the last 6 months?   No      Have you been diagnosed with or been told you have cirrhosis of the liver?   No      Are you currently on dialysis?   No      Do you need assistance transferring?   No    BMI: Estimated body mass index is 32.61 kg/m  as calculated from the following:    Height as of 4/12/24: 1.613 m (5' 3.5\").    Weight as of 4/12/24: 84.8 kg (187 lb).     Is patients BMI > 40 and scheduling " "location [facility]?  No      Do you take an injectable medication for weight loss or diabetes (excluding insulin)?  Yes, hold time can be up to 7 days. Please consult with you prescribing provider to discuss endoscopy recommendations.      Do you take the medication Naltrexone?  No    Do you take blood thinners?  No       Prep   Are you currently on dialysis or do you have chronic kidney disease?  No    Do you have a diagnosis of diabetes?  Yes (Golytely Prep)    Do you have a diagnosis of cystic fibrosis (CF)?  No    On a regular basis do you go 3 -5 days between bowel movements?  LATELY YES, \"3-5 DAY RANGE\" per patient    BMI > 40?  No    Preferred Pharmacy:    [id number] - [address] AT [address] [facility], [address]  [address] [facility] [address]  [facility] [address]  Phone: [phone] Fax: [phone]      Final Scheduling Details     Procedure scheduled  Colonoscopy    Surgeon:  ANGEL     Date of procedure:  7/18/24     Pre-OP / PAC:   No - Not required for this site.    Location  [facility] - Patient preference. Sooner availability    Sedation   Moderate Sedation - Per order.      Patient Reminders:   You will receive a call from a Nurse to review instructions and health history.  This assessment must be completed prior to your procedure.  Failure to complete the Nurse assessment may result in the procedure being cancelled.      On the day of your procedure, please designate an adult(s) who can drive you home stay with you for the next 24 hours. The medicines used in the exam will make you sleepy. You will not be able to drive.      You cannot take public transportation, ride share services, or non-medical taxi service without a responsible caregiver.  Medical transport services are allowed with the requirement that a responsible caregiver will receive you at your destination.  We require that drivers and caregivers are confirmed prior to your procedure.    "

## 2024-05-01 ENCOUNTER — TELEPHONE (OUTPATIENT)
Dept: OPHTHALMOLOGY | Facility: CLINIC | Age: 39
End: 2024-05-01
Payer: COMMERCIAL

## 2024-05-01 NOTE — TELEPHONE ENCOUNTER
Spoke with patient regarding rescheduling for a later same day appointment on 10/10/24 due to provider Template change. Rescheduled patient as offered and [facility]

## 2024-05-21 ENCOUNTER — ANCILLARY PROCEDURE (OUTPATIENT)
Dept: MAMMOGRAPHY | Facility: CLINIC | Age: 39
End: 2024-05-21
Attending: FAMILY MEDICINE
Payer: COMMERCIAL

## 2024-05-21 ENCOUNTER — ANCILLARY PROCEDURE (OUTPATIENT)
Dept: CT IMAGING | Facility: CLINIC | Age: 39
End: 2024-05-21
Attending: FAMILY MEDICINE
Payer: COMMERCIAL

## 2024-05-21 DIAGNOSIS — R91.8 PULMONARY NODULES: ICD-10-CM

## 2024-05-21 DIAGNOSIS — Z12.31 VISIT FOR SCREENING MAMMOGRAM: ICD-10-CM

## 2024-05-21 LAB
CREAT BLD-MCNC: 0.4 MG/DL (ref 0.5–1)
EGFRCR SERPLBLD CKD-EPI 2021: >60 ML/MIN/1.73M2

## 2024-05-21 PROCEDURE — 77067 SCR MAMMO BI INCL CAD: CPT | Performed by: RADIOLOGY

## 2024-05-21 PROCEDURE — 71260 CT THORAX DX C+: CPT | Mod: GC | Performed by: RADIOLOGY

## 2024-05-21 PROCEDURE — 77063 BREAST TOMOSYNTHESIS BI: CPT | Performed by: RADIOLOGY

## 2024-05-21 PROCEDURE — 82565 ASSAY OF CREATININE: CPT | Performed by: PATHOLOGY

## 2024-05-21 RX ORDER — IOPAMIDOL 755 MG/ML
92 INJECTION, SOLUTION INTRAVASCULAR ONCE
Status: COMPLETED | OUTPATIENT
Start: 2024-05-21 | End: 2024-05-21

## 2024-05-21 RX ADMIN — IOPAMIDOL 92 ML: 755 INJECTION, SOLUTION INTRAVASCULAR at 14:11

## 2024-05-21 NOTE — DISCHARGE INSTRUCTIONS

## 2024-05-22 NOTE — RESULT ENCOUNTER NOTE
Dear Isabel,   Your CT scan revealed multiple pulmonary nodules.  They are extremely small. They range from 2 mm to 6 mm. I would recommend monitoring them once every few years.     Best Regards  Neal Harris MD

## 2024-06-20 RX ORDER — POLYETHYLENE GLYCOL 3350 17 G/17G
POWDER, FOR SOLUTION ORAL
Qty: 238 G | Refills: 0 | Status: SHIPPED | OUTPATIENT
Start: 2024-06-20

## 2024-06-20 RX ORDER — BISACODYL 5 MG/1
TABLET, DELAYED RELEASE ORAL
Qty: 4 TABLET | Refills: 0 | Status: ON HOLD | OUTPATIENT
Start: 2024-06-20 | End: 2024-07-18

## 2024-06-20 NOTE — TELEPHONE ENCOUNTER
Low Volume Extended Golytely recommended due to GLP-1 agonist medication noted in chart.  Instructions were sent via [employer]. Bowel prep was sent 6/20/2024 to      [id number] - [place], [place] - [address] [place] [address] AT [address] [facility], [address].       Lore Devi RN Colorectal Cancer    Division of Gastroenterology at [facility]

## 2024-07-10 ENCOUNTER — TELEPHONE (OUTPATIENT)
Dept: GASTROENTEROLOGY | Facility: CLINIC | Age: 39
End: 2024-07-10

## 2024-07-10 NOTE — TELEPHONE ENCOUNTER
Pre visit planning completed.      Procedure details:    Patient scheduled for Colonoscopy  on 7/18/24.     Arrival time: 0915. Procedure time 1000    Facility location: [facility]; [address] [place] [address] [place] [address]. Check in location: [address] [facility].     Sedation type: Conscious sedation     Pre op exam needed? N/A    Indication for procedure: screening       Chart review:     Electronic implanted devices? No    Recent diagnosis of diverticulitis within the last 6 weeks? No    Diabetic? Yes. Diabetic medication HOLDING recommendations: Oral diabetic medications: Farxiga (dapagliflozin). HOLD 3 days before procedure.   Diabetic injectables: Ozempic (Semaglutide). Weekly dosing of medication.  Hold 7 days before procedure.  Follow up with managing provider.       Medication review:    Anticoagulants? No    NSAIDS? Yes.  Ibuprofen (Advil, Motrin).  Holding interval of 1 day before procedure.    Other medication HOLDING recommendations:  N/A      Prep for procedure:     Bowel prep recommendation: Low Volume Extended Golytely. Bowel prep prescription sent to    [id number] - [place], [place] - [address] [place] [address] AT [address] [facility], [address]   Due to: GLP-1 agonist medication noted in chart.     Prep instructions sent via [employer]         Jammie Rutledge RN  Endoscopy Procedure Pre Assessment RN  [phone] option 4

## 2024-07-11 NOTE — TELEPHONE ENCOUNTER
Pre assessment completed for upcoming procedure.   (Please see previous telephone encounter notes for complete details)      Procedure details:    Arrival time and facility location reviewed.    Pre op exam needed? N/A    Designated  policy reviewed. Instructed to have someone stay 6  hours post procedure.       Medication review:    Medications reviewed. Please see supporting documentation below. Holding recommendations discussed (if applicable).       Prep for procedure:     Procedure prep instructions reviewed.        Any additional information needed:  N/A      Patient  verbalized understanding and had no questions or concerns at this time.      Jammie Rutledge RN  Endoscopy Procedure Pre Assessment   [phone] option 4

## 2024-07-18 ENCOUNTER — PATIENT OUTREACH (OUTPATIENT)
Dept: GASTROENTEROLOGY | Facility: CLINIC | Age: 39
End: 2024-07-18
Payer: COMMERCIAL

## 2024-07-18 ENCOUNTER — HOSPITAL ENCOUNTER (OUTPATIENT)
Facility: CLINIC | Age: 39
Discharge: HOME | End: 2024-07-18
Attending: COLON & RECTAL SURGERY | Admitting: COLON & RECTAL SURGERY
Payer: COMMERCIAL

## 2024-07-18 VITALS
RESPIRATION RATE: 19 BRPM | DIASTOLIC BLOOD PRESSURE: 81 MMHG | OXYGEN SATURATION: 98 % | HEART RATE: 93 BPM | SYSTOLIC BLOOD PRESSURE: 118 MMHG | HEIGHT: 64 IN | WEIGHT: 190 LBS | BODY MASS INDEX: 32.44 KG/M2

## 2024-07-18 LAB — COLONOSCOPY: NORMAL

## 2024-07-18 PROCEDURE — 45378 DIAGNOSTIC COLONOSCOPY: CPT | Performed by: COLON & RECTAL SURGERY

## 2024-07-18 PROCEDURE — G0105 COLORECTAL SCRN; HI RISK IND: HCPCS | Performed by: COLON & RECTAL SURGERY

## 2024-07-18 PROCEDURE — G0500 MOD SEDAT ENDO SERVICE >5YRS: HCPCS | Performed by: COLON & RECTAL SURGERY

## 2024-07-18 PROCEDURE — 250N000011 HC RX IP 250 OP 636: Performed by: COLON & RECTAL SURGERY

## 2024-07-18 RX ORDER — FENTANYL CITRATE 50 UG/ML
INJECTION, SOLUTION INTRAMUSCULAR; INTRAVENOUS PRN
Status: DISCONTINUED | OUTPATIENT
Start: 2024-07-18 | End: 2024-07-18 | Stop reason: HOSPADM

## 2024-07-18 RX ORDER — ONDANSETRON 2 MG/ML
4 INJECTION INTRAMUSCULAR; INTRAVENOUS
Status: DISCONTINUED | OUTPATIENT
Start: 2024-07-18 | End: 2024-07-18 | Stop reason: HOSPADM

## 2024-07-18 RX ORDER — LIDOCAINE 40 MG/G
CREAM TOPICAL
Status: DISCONTINUED | OUTPATIENT
Start: 2024-07-18 | End: 2024-07-18 | Stop reason: HOSPADM

## 2024-07-18 RX ORDER — LORATADINE 10 MG/1
10 TABLET ORAL DAILY
COMMUNITY

## 2024-07-18 ASSESSMENT — ACTIVITIES OF DAILY LIVING (ADL)
ADLS_ACUITY_SCORE: 35
ADLS_ACUITY_SCORE: 35
ADLS_ACUITY_SCORE: 33

## 2024-07-18 NOTE — H&P
Colon & Rectal Surgery History and Physical  Pre-Endoscopy Procedure Note    History of Present Illness   I have been asked by Dr. Harris to evaluate this 39 year old female for colorectal cancer screening. She currently denies any abdominal pain, weight loss, bleeding per rectum, or recent change in bowel habits.    Past Medical History  Diagnosis Date    Gestational diabetes 2007    Hyperlipidemia 8/6/2008    Hyperlipidemia  dx age 8    Type 2 diabetes 7/08       Past Surgical History  Procedure Laterality Date    DILATION/CURETTAGE DIAG/THER NON OB  01/01/2006    TUBAL LIGATION  2021        Medications     Medications Prior to Admission   Medication Sig    atorvastatin (LIPITOR) 40 MG tablet Take 40 mg by mouth daily    dapagliflozin (FARXIGA) 10 MG TABS tablet Take 1 tablet (10 mg) by mouth daily    fluticasone (FLONASE) 50 MCG/ACT nasal spray Spray 1 spray into both nostrils daily    ibuprofen (ADVIL/MOTRIN) 600 MG tablet Take 1 tablet (600 mg) by mouth every 6 hours as needed    loratadine (CLARITIN) 10 MG tablet Take 10 mg by mouth daily    Fluticasone Propionate (FLONASE INHA 50 MCG/DOSE NA) Spray 2 sprays in nostril daily. 2 sprays in each nostril at bedtime daily    LANCETS MISC. KIT PER PATIENT HEALTH PLAN WITH LANCETS    polyethylene glycol (MIRALAX) 17 GM/Dose powder Take 1 capful (17g) of Miralax mixed with 8 oz of a clear liquid twice daily for 7 days prior to your scheduled procedure.    Semaglutide, 2 MG/DOSE, (OZEMPIC) 8 MG/3ML pen Inject 2 mg Subcutaneous every 7 days       Allergies     Allergies   Allergen Reactions    Canagliflozin      Vaginal itching    Metformin      Leg muscle pain    Amoxicillin-Pot Clavulanate Diarrhea, Nausea, Nausea and Vomiting and GI Disturbance        Family History   Family history includes Anxiety Disorder in her son; Asthma in her son; C.A.D. in her maternal grandfather; Circulatory Disorder in her maternal grandmother and paternal grandfather; Colon Cancer (age of  "onset: 50) in her mother; Diabetes in her father, maternal grandmother, mother, paternal grandfather, paternal grandmother, and sister; Eye Disorder in her maternal grandfather, maternal grandmother, paternal grandfather, and paternal grandmother; Genetic Disorder in her paternal grandmother; Hyperlipidemia in her father and mother; Hypertension in her maternal grandfather, maternal grandmother, mother, and paternal grandfather; Obesity in her brother, mother, and sister; Prostate Cancer in her maternal grandfather; Thyroid Disease in her mother.     Social History    She reports that she has never smoked. She does not have any smokeless tobacco history on file. She reports that she does not drink alcohol and does not use drugs.    Review of Systems   Constitutional:  No fever, weight change or fatigue.    Eyes:     No dry eyes or vision changes.   Ears/Nose/Throat/Neck:  No oral ulcers, sore throat or voice change.    Cardiovascular:   No palpitations, syncope, angina or edema.   Respiratory:    No chest pain, excessive sleepiness, shortness of breath or hemoptysis.    Gastrointestinal:   No abdominal pain, nausea, vomiting, diarrhea or heartburn.    Genitourinary:   No dysuria, hematuria, urinary retention or urinary frequency.   Musculoskeletal:  No joint swelling or arthralgias.    Dermatologic:  No skin rash or other skin changes.   Neurologic:    No focal weakness or numbness. No neuropathy.   Psychiatric:    No depression, anxiety, suicidal ideation, or paranoid ideation.   Endocrine:   No cold or heat intolerance, polydipsia, hirsutism, change in libido, or flushing.   Hematology/Lymphatic:  No bleeding or lymphadenopathy.    Allergy/Immunology:  No rhinitis or hives.     Physical Exam   Vitals:  Blood pressure 116/78, pulse 100, resp. rate 16, height 1.614 m (5' 3.54\"), weight 86.2 kg (190 lb), SpO2 98%.    General:  Alert and oriented to person, place and time   Airway: Normal oropharyngeal airway and neck " mobility   Lungs:  Clear bilaterally   Heart:  Regular rate and rhythm   Abdomen: Soft, NT, ND, no masses   Extremities: Warm, good capillary refill    ASA Grade: II (mild systemic disease)    Impression: Cleared for use of conscious sedation for colorectal cancer screening    Plan: Proceed with colonoscopy     Diamond Anderson MD  [facility]  [phone]

## 2024-08-21 ENCOUNTER — MYC MEDICAL ADVICE (OUTPATIENT)
Dept: FAMILY MEDICINE | Facility: CLINIC | Age: 39
End: 2024-08-21

## 2024-08-21 ENCOUNTER — OFFICE VISIT (OUTPATIENT)
Dept: FAMILY MEDICINE | Facility: CLINIC | Age: 39
End: 2024-08-21
Payer: COMMERCIAL

## 2024-08-21 VITALS
DIASTOLIC BLOOD PRESSURE: 84 MMHG | WEIGHT: 190.2 LBS | SYSTOLIC BLOOD PRESSURE: 128 MMHG | TEMPERATURE: 97.4 F | OXYGEN SATURATION: 98 % | BODY MASS INDEX: 32.47 KG/M2 | HEIGHT: 64 IN | RESPIRATION RATE: 18 BRPM | HEART RATE: 100 BPM

## 2024-08-21 DIAGNOSIS — E66.09 CLASS 1 OBESITY DUE TO EXCESS CALORIES WITH SERIOUS COMORBIDITY AND BODY MASS INDEX (BMI) OF 33.0 TO 33.9 IN ADULT: ICD-10-CM

## 2024-08-21 DIAGNOSIS — E66.811 CLASS 1 OBESITY DUE TO EXCESS CALORIES WITH SERIOUS COMORBIDITY AND BODY MASS INDEX (BMI) OF 33.0 TO 33.9 IN ADULT: ICD-10-CM

## 2024-08-21 DIAGNOSIS — E11.9 TYPE 2 DIABETES, HBA1C GOAL < 7% (H): Primary | ICD-10-CM

## 2024-08-21 DIAGNOSIS — F32.2 MAJOR DEPRESSIVE DISORDER, SINGLE EPISODE, SEVERE WITHOUT PSYCHOTIC FEATURES (H): ICD-10-CM

## 2024-08-21 DIAGNOSIS — E78.5 HYPERLIPIDEMIA LDL GOAL <100: ICD-10-CM

## 2024-08-21 LAB
CHOLEST SERPL-MCNC: 205 MG/DL
HBA1C MFR BLD: 6.7 % (ref 0–5.6)
HDLC SERPL-MCNC: 37 MG/DL
HOLD SPECIMEN: NORMAL
LDLC SERPL CALC-MCNC: 146 MG/DL
NONHDLC SERPL-MCNC: 168 MG/DL
TRIGL SERPL-MCNC: 111 MG/DL

## 2024-08-21 PROCEDURE — 36415 COLL VENOUS BLD VENIPUNCTURE: CPT | Performed by: FAMILY MEDICINE

## 2024-08-21 PROCEDURE — 83036 HEMOGLOBIN GLYCOSYLATED A1C: CPT | Performed by: FAMILY MEDICINE

## 2024-08-21 PROCEDURE — 80061 LIPID PANEL: CPT | Performed by: FAMILY MEDICINE

## 2024-08-21 PROCEDURE — 99214 OFFICE O/P EST MOD 30 MIN: CPT | Performed by: FAMILY MEDICINE

## 2024-08-21 RX ORDER — ACYCLOVIR 400 MG/1
1 TABLET ORAL ONCE
Qty: 1 EACH | Refills: 0 | Status: SHIPPED | OUTPATIENT
Start: 2024-08-21 | End: 2024-08-21

## 2024-08-21 RX ORDER — KETOROLAC TROMETHAMINE 30 MG/ML
1 INJECTION, SOLUTION INTRAMUSCULAR; INTRAVENOUS ONCE
Qty: 1 EACH | Refills: 0 | Status: SHIPPED | OUTPATIENT
Start: 2024-08-21 | End: 2024-08-21

## 2024-08-21 RX ORDER — ACYCLOVIR 400 MG/1
1 TABLET ORAL
Qty: 9 EACH | Refills: 5 | Status: SHIPPED | OUTPATIENT
Start: 2024-08-21

## 2024-08-21 RX ORDER — BLOOD-GLUCOSE SENSOR
1 EACH MISCELLANEOUS
Qty: 6 EACH | Refills: 5 | Status: SHIPPED | OUTPATIENT
Start: 2024-08-21

## 2024-08-21 ASSESSMENT — PAIN SCALES - GENERAL: PAINLEVEL: NO PAIN (0)

## 2024-08-21 NOTE — PROGRESS NOTES
Assessment & Plan     Isabel was seen today for diabetes.    Diagnoses and all orders for this visit:    Type 2 diabetes, HbA1c goal < 7% (H)  Assessment: 39 year old female for follow up of diabetes. Acute symptoms: none.  Diabetic Review of Systems - Medication compliance:  noncompliant much of the time, Diabetic diet compliance:  compliant most of the time, Home glucose monitoring:  blood glucose record was brought in with patient today, are performed regulary, 2-3 times daily, values range  fasting and less than 180 post prandial. Patient stopped taking all medications except for ozempic. She discontinued fraxiga and lipitor. Desires to adjust her diet and continue with moujaro alone.  If she continues to have morning hyperglycemia I would recommend taking Fraxiga at night.  A1C is stable  PLAN: Per [facility] orders. Issues reviewed with her: .  -     COMPREHENSIVE METABOLIC PANEL; Future  -     Hemoglobin A1c; Future    - Dexacom 7 prescription was sent to her pharmacy on file.   -     Lipid panel reflex to direct LDL Fasting; Future  -     Comprehensive metabolic panel (BMP + Alb, Alk Phos, ALT, AST, Total. Bili, TP); Future  -     Hemoglobin A1c; Future  -     Albumin Random Urine Quantitative with Creat Ratio; Future  -     tirzepatide (MOUNJARO) 5 MG/0.5ML pen; Inject 5 mg subcutaneously every 7 days.    Class 1 obesity due to excess calories with serious comorbidity and body mass index (BMI) of 33.0 to 33.9 in adult  Patient was switched from Ozempic 2 mg to Mounjaro to help with morning hyperglycemia and assist with weight loss.  I would recommend titration of Mounjaro to 7.5 or 10 mg.   -     tirzepatide (MOUNJARO) 5 MG/0.5ML pen; Inject 5 mg subcutaneously every 7 days.    Hyperlipidemia LDL goal <100  -     Lipid panel reflex to direct LDL Fasting; Future    Major depressive disorder, single episode, severe without psychotic features (H)  Patient is not taking any medications.  Reports feeling  "well mentally.    Other orders  -     Extra Tube; Future  -     PRIMARY CARE FOLLOW-UP SCHEDULING; Future         Follow-up Visit   Expected date:  Oct 21, 2024 (Approximate)      Follow Up Appointment Details:     Follow-up with whom?: Me    Follow-Up for what?: Chronic Disease f/u    Chronic Disease f/u: Diabetes    How?: In Person or Virtual                       BMI  Estimated body mass index is 33.16 kg/m  as calculated from the following:    Height as of this encounter: 1.613 m (5' 3.5\").    Weight as of this encounter: 86.3 kg (190 lb 3.2 oz).       Tania Hall is a 39 year old, presenting for the following health issues:  Diabetes        8/21/2024     9:16 AM   Additional Questions   Roomed by Rose Mary TAPIA     History of Present Illness       Diabetes:   She presents for follow up of diabetes.  She is checking home blood glucose two times daily.   She checks blood glucose before meals, after meals, before and after meals and at bedtime.  Blood glucose is never over 200 and never under 70. She is aware of hypoglycemia symptoms including shakiness, dizziness and weakness.    She has no concerns regarding her diabetes at this time.  She is having weight gain.            She eats 2-3 servings of fruits and vegetables daily.She consumes 0 sweetened beverage(s) daily.She exercises with enough effort to increase her heart rate 30 to 60 minutes per day.  She exercises with enough effort to increase her heart rate 4 days per week. She is missing 2 dose(s) of medications per week.  Patient presents to clinic for follow-up for diabetes.  Review of Systems  Constitutional, neuro, ENT, endocrine, pulmonary, cardiac, gastrointestinal, genitourinary, musculoskeletal, integument and psychiatric systems are negative, except as otherwise noted.      Objective    /84   Pulse 100   Temp 97.4  F (36.3  C) (Temporal)   Resp 18   Ht 1.613 m (5' 3.5\")   Wt 86.3 kg (190 lb 3.2 oz)   SpO2 98%   BMI 33.16 kg/m    Body " mass index is 33.16 kg/m .  Physical Exam   GENERAL: alert and no distress  RESP: lungs clear to auscultation - no rales, rhonchi or wheezes  CV: regular rate and rhythm, normal S1 S2, no S3 or S4, no murmur, click or rub, no peripheral edema  NEURO: Normal strength and tone, mentation intact and speech normal  PSYCH: mentation appears normal, affect normal/bright    Admission on 07/18/2024, Discharged on 07/18/2024   Component Date Value Ref Range Status    COLONOSCOPY 07/18/2024    Corrected                    Value:[facility]  [address] [place]  [address]  _______________________________________________________________________________  Patient Name: Isabel Ninaenas         Procedure Date: 7/18/2024 9:58 AM  MRN: [id number]                       Account Number: [id number]  YOB: 1985              Admit Type: Outpatient  Age: 39                               Room: 2  Note Status: Supervisor Override      Attending MD: BRYCE ORTIZ MD,   Instrument Name: 514 [id number] Peds Colon   _______________________________________________________________________________     Procedure:                Colonoscopy  Indications:              Colon cancer screening in patient at increased                             risk: Colorectal cancer in mother  Providers:                BRYCE ORTIZ MD, Niya Arias RN  Referring MD:             JOHN PAUL LUKE  Medicines:                Fentanyl 100 micrograms IV, Midazolam 2 mg IV  Complications:            No immed                          iate complications. Estimated blood loss:                             Minimal.  _______________________________________________________________________________  Procedure:                Pre-Anesthesia Assessment:                            - Prior to the procedure, a History and Physical                             was performed, and patient medications and                             allergies  were reviewed. The patient is competent.                             The risks and benefits of the procedure and the                             sedation options and risks were discussed with the                             patient. All questions were answered and informed                             consent was obtained. Patient identification and                             proposed procedure were verified by the physician                             in the procedure room. Mental Status Examination:                             alert and oriented. Airway Examination: normal                                                       oropharyngeal airway and neck mobility. Respiratory                             Examination: clear to auscultation. CV Examination:                             normal. ASA Grade Assessment: II - A patient with                             mild systemic disease. After reviewing the risks                             and benefits, the patient was deemed in                             satisfactory condition to undergo the procedure.                             The anesthesia plan was to use moderate sedation /                             analgesia (conscious sedation). Immediately prior                             to administration of medications, the patient was                             re-assessed for adequacy to receive sedatives. The                             heart rate, respiratory rate, oxygen saturations,                             blood pressure, adequacy of pulmonary ventilation,                             and response to care were monitored throughout the                                                       procedure. The physical status of the patient was                             re-assessed after the procedure.                            After obtaining informed consent, the colonoscope                             was passed under direct vision. Throughout the                              procedure, the patient's blood pressure, pulse, and                             oxygen saturations were monitored continuously. The                             peds colonoscope 514 was introduced through the                             anus and advanced to the cecum, identified by                             appendiceal orifice and ileocecal valve. The                             ileocecal valve and the appendiceal orifice were                             photographed. The entire colon was well visualized.                             The colonoscopy was performed with ease. The                             patient tolerated the procedure well. The quality                                                       of the bowel preparation was excellent.                                                                                   Findings:       The perianal and digital rectal examinations were normal.       The entire examined colon appeared normal.                                                                                   Impression:               - The entire examined colon is normal.                            - No specimens collected.  Recommendation:           - Discharge patient to home (ambulatory).                            - Repeat colonoscopy in 5 years for screening                             purposes.                                                                                   Procedure Code(s):       --- Professional ---       64624, Colonoscopy, flexible; diagnostic, including collection of        specimen(s) by brushing or washing, when performed (separate procedure)  Diagnosis Code(s):       --- Professional ---       Z80.0, Family history of malignant neoplas                          m of digestive organs    CPT copyright 2022 [employer] Medical Association. All rights reserved.    The codes documented in this report are preliminary and upon  review may   be revised to  meet current compliance requirements.    _______________________  BRYCE ORTIZ MD  7/18/2024 10:30:07 AM  I was physically present for the entire viewing portion of the exam.  BRYCE ORTIZ MD  Number of Addenda: 0    Note Initiated On: 7/18/2024 9:58 AM  Scope Withdrawal Time: 0 hours 6 minutes 58 seconds   Total Procedure Duration: 0 hours 16 minutes 15 seconds   Scope In: 10:09:51 AM  Scope Out: 10:26:06 AM             Signed Electronically by: Neal Harrsi MD

## 2024-08-22 RX ORDER — ATORVASTATIN CALCIUM 40 MG/1
40 TABLET, FILM COATED ORAL DAILY
Qty: 90 TABLET | Refills: 3 | Status: SHIPPED | OUTPATIENT
Start: 2024-08-22

## 2024-09-16 ENCOUNTER — VIRTUAL VISIT (OUTPATIENT)
Dept: FAMILY MEDICINE | Facility: CLINIC | Age: 39
End: 2024-09-16
Payer: COMMERCIAL

## 2024-09-16 DIAGNOSIS — E11.9 TYPE 2 DIABETES, HBA1C GOAL < 7% (H): Primary | ICD-10-CM

## 2024-09-16 PROCEDURE — 99214 OFFICE O/P EST MOD 30 MIN: CPT | Mod: 95 | Performed by: FAMILY MEDICINE

## 2024-09-16 ASSESSMENT — PATIENT HEALTH QUESTIONNAIRE - PHQ9
SUM OF ALL RESPONSES TO PHQ QUESTIONS 1-9: 3
10. IF YOU CHECKED OFF ANY PROBLEMS, HOW DIFFICULT HAVE THESE PROBLEMS MADE IT FOR YOU TO DO YOUR WORK, TAKE CARE OF THINGS AT HOME, OR GET ALONG WITH OTHER PEOPLE: SOMEWHAT DIFFICULT

## 2024-09-16 NOTE — PROGRESS NOTES
"Isabel is a 39 year old who is being evaluated via a billable video visit.    How would you like to obtain your AVS? MyChart  If the video visit is dropped, the invitation should be resent by: Text to cell phone: [phone]  Will anyone else be joining your video visit? No    Assessment & Plan     Isabel was seen today for diabetes.    Diagnoses and all orders for this visit:    Type 2 diabetes, HbA1c goal < 7% (H)  Assessment: 39 year old female for follow up of diabetes. Acute symptoms: none.  Diabetic Review of Systems - Medication compliance:  noncompliant much of the time, Diabetic diet compliance:  compliant most of the time, Home glucose monitoring: patient has a Dexcom continous glucose meter at home. Blood glucose are above goal 25% of the time. Patient stopped taking all medications except for Mounjaro. She discontinued fraxiga and lipitor. Desires to adjust her diet and continue with moujaro alone.  If she continues to have morning hyperglycemia I would recommend restarting Fraxiga and taking it at night. Advised patient to schedule a lab only visit before our next visit.   A1C is stable  PLAN: Per [facility] orders. Issues reviewed with her: .  -     COMPREHENSIVE METABOLIC PANEL; Future  -     Hemoglobin A1c; Future  -     tirzepatide (MOUNJARO) 7.5 MG/0.5ML pen; Inject 7.5 mg subcutaneously every 7 days.    Other orders  -     PRIMARY CARE FOLLOW-UP SCHEDULING; Future         Follow-up Visit   Expected date:  Dec 16, 2024 (Approximate)      Follow Up Appointment Details:     Follow-up with whom?: Me    Follow-Up for what?: Chronic Disease f/u    Chronic Disease f/u: Diabetes    How?: In Person                       BMI  Estimated body mass index is 33.16 kg/m  as calculated from the following:    Height as of 8/21/24: 1.613 m (5' 3.5\").    Weight as of 8/21/24: 86.3 kg (190 lb 3.2 oz).       Subjective   Isabel is a 39 year old, presenting for the following health issues:  Diabetes        9/16/2024     " 3:30 PM   Additional Questions   Roomed by Kaden ROCA     History of Present Illness       Diabetes:   She presents for follow up of diabetes.   She is checking home blood glucose with a continuous glucose monitor.   She checks blood glucose before meals, after meals, before and after meals and at bedtime.  Blood glucose is sometimes over 200 and never under 70. She is aware of hypoglycemia symptoms including shakiness, dizziness and weakness.    She has no concerns regarding her diabetes at this time.   She is not experiencing numbness or burning in feet, excessive thirst, blurry vision, weight changes or redness, sores or blisters on feet.           She eats 2-3 servings of fruits and vegetables daily.She consumes 0 sweetened beverage(s) daily.She exercises with enough effort to increase her heart rate 20 to 29 minutes per day.  She exercises with enough effort to increase her heart rate 5 days per week.   She is taking medications regularly.  Average blood 165 mg/dl. High 25%.     Diabetes Follow-up    How often are you checking your blood sugar? Continuous glucose monitor  What time of day are you checking your blood sugars (select all that apply)?  Not applicable  Have you had any blood sugars above 200?  Yes - high blood glucose 25% of the time.   Have you had any blood sugars below 70?  No  What symptoms do you notice when your blood sugar is low?  None  What concerns do you have today about your diabetes? None   Do you have any of these symptoms? (Select all that apply)  No numbness or tingling in feet.  No redness, sores or blisters on feet.  No complaints of excessive thirst.  No reports of blurry vision.  No significant changes to weight.      BP Readings from Last 2 Encounters:   08/21/24 128/84   07/18/24 118/81     Hemoglobin A1C (%)   Date Value   08/21/2024 6.7 (H)   04/12/2024 8.0 (H)   08/12/2010 6.6 (H)   01/05/2010 7.6 (H)     LDL Cholesterol Calculated (mg/dL)   Date Value   08/21/2024 146 (H)    04/12/2024 97   08/12/2010 82   01/05/2010 84       Review of Systems  Constitutional, neuro, ENT, endocrine, pulmonary, cardiac, gastrointestinal, genitourinary, musculoskeletal, integument and psychiatric systems are negative, except as otherwise noted.      Objective    Vitals - Patient Reported  Pain Score: No Pain (0)      Vitals:  No vitals were obtained today due to virtual visit.    Physical Exam   GENERAL: alert and no distress  EYES: Eyes grossly normal to inspection.  No discharge or erythema, or obvious scleral/conjunctival abnormalities.  RESP: No audible wheeze, cough, or visible cyanosis.    SKIN: Visible skin clear. No significant rash, abnormal pigmentation or lesions.  NEURO: Cranial nerves grossly intact.  Mentation and speech appropriate for age.  PSYCH: Appropriate affect, tone, and pace of words    No results found for any visits on 09/16/24.      Video-Visit Details    Type of service:  Video Visit   Originating Location (pt. Location): Home  Distant Location (provider location):  On-site  Platform used for Video Visit: Kami  Signed Electronically by: Neal Harris MD

## 2024-10-10 ENCOUNTER — OFFICE VISIT (OUTPATIENT)
Dept: OPHTHALMOLOGY | Facility: CLINIC | Age: 39
End: 2024-10-10
Payer: COMMERCIAL

## 2024-10-10 DIAGNOSIS — H40.053 BORDERLINE GLAUCOMA OF BOTH EYES WITH OCULAR HYPERTENSION: Primary | ICD-10-CM

## 2024-10-10 PROCEDURE — 92083 EXTENDED VISUAL FIELD XM: CPT | Performed by: OPTOMETRIST

## 2024-10-10 PROCEDURE — 99213 OFFICE O/P EST LOW 20 MIN: CPT | Performed by: OPTOMETRIST

## 2024-10-10 ASSESSMENT — VISUAL ACUITY
OS_PH_SC: 20/40
OS_SC: 20/80
OD_SC: 20/20
OD_SC+: -1
OS_PH_SC+: -2
METHOD: SNELLEN - LINEAR

## 2024-10-10 ASSESSMENT — CONF VISUAL FIELD
OS_INFERIOR_NASAL_RESTRICTION: 0
OD_NORMAL: 1
OS_INFERIOR_TEMPORAL_RESTRICTION: 0
OD_SUPERIOR_NASAL_RESTRICTION: 0
OD_INFERIOR_TEMPORAL_RESTRICTION: 0
METHOD: COUNTING FINGERS
OS_SUPERIOR_TEMPORAL_RESTRICTION: 0
OD_INFERIOR_NASAL_RESTRICTION: 0
OS_NORMAL: 1
OS_SUPERIOR_NASAL_RESTRICTION: 0
OD_SUPERIOR_TEMPORAL_RESTRICTION: 0

## 2024-10-10 ASSESSMENT — CUP TO DISC RATIO
OS_RATIO: 0.55
OD_RATIO: 0.55

## 2024-10-10 ASSESSMENT — TONOMETRY
OS_IOP_MMHG: 22
IOP_METHOD: APPLANATION
OD_IOP_MMHG: 22

## 2024-10-10 ASSESSMENT — SLIT LAMP EXAM - LIDS
COMMENTS: NORMAL
COMMENTS: NORMAL

## 2024-10-10 ASSESSMENT — PACHYMETRY
OS_CT(UM): 595
OD_CT(UM): 584

## 2024-10-10 NOTE — PROGRESS NOTES
A/P  1.) Ocular Hypertension  -IOP 26/26 last exam with thick pachy 584/595  -IOP improved today at 22/22. No change in ONH RNFL. Baseline visual field normal  -Low suspicion at this time but continue to monitor for glaucoma suspect    2.) Type 2 DM with mild NPDR OU  -Not eval'd today.     3.) Anisometropic amblyopia left eye  -BCVA 20/25 left eye but with high aniso/cyl  -Not eval'd today    Follow-up 1 year diabetic eye exam + RNFL/IOP check. Sooner prn    I have confirmed the patient's CC, HPI and reviewed Past Medical History, Past Surgical History, Social History, Family History, Problem List, Medication List and agree with Tech note.     Odalis Darling, LAURA MOISEO ZENONS

## 2024-10-10 NOTE — NURSING NOTE
Chief Complaints and History of Present Illnesses   Patient presents with    Follow Up     Patient present today for a 6 month follow up due to Ocular Hypertension     Chief Complaint(s) and History of Present Illness(es)       Follow Up              Laterality: both eyes    Treatments tried: no treatments    Pain scale: 0/10    Comments: Patient present today for a 6 month follow up due to Ocular Hypertension              Comments    Since last exam there have been no changes or new concerns each eye.    Reports she does not have any eye pain but does have some sinus issues that cause some neck pain, congestions, ears have been popping and feels pressure with right eye.   Feels eye pressure related to sinus. This has been an issues the past week mostly however has sinus issues for years.     Stacey Simon, COT COT 12:11 PM October 10, 2024

## 2024-11-25 ENCOUNTER — OFFICE VISIT (OUTPATIENT)
Dept: FAMILY MEDICINE | Facility: CLINIC | Age: 39
End: 2024-11-25
Attending: FAMILY MEDICINE
Payer: COMMERCIAL

## 2024-11-25 VITALS
TEMPERATURE: 97.3 F | SYSTOLIC BLOOD PRESSURE: 131 MMHG | DIASTOLIC BLOOD PRESSURE: 83 MMHG | RESPIRATION RATE: 16 BRPM | HEIGHT: 64 IN | OXYGEN SATURATION: 100 % | WEIGHT: 196.5 LBS | HEART RATE: 105 BPM | BODY MASS INDEX: 33.55 KG/M2

## 2024-11-25 DIAGNOSIS — H40.053 BORDERLINE GLAUCOMA WITH OCULAR HYPERTENSION, BILATERAL: ICD-10-CM

## 2024-11-25 DIAGNOSIS — E11.3393 CONTROLLED TYPE 2 DIABETES MELLITUS WITH BOTH EYES AFFECTED BY MODERATE NONPROLIFERATIVE RETINOPATHY WITHOUT MACULAR EDEMA, WITHOUT LONG-TERM CURRENT USE OF INSULIN (H): Primary | ICD-10-CM

## 2024-11-25 DIAGNOSIS — L02.93 CARBUNCLE: ICD-10-CM

## 2024-11-25 DIAGNOSIS — E66.09 CLASS 1 OBESITY DUE TO EXCESS CALORIES WITH SERIOUS COMORBIDITY AND BODY MASS INDEX (BMI) OF 33.0 TO 33.9 IN ADULT: ICD-10-CM

## 2024-11-25 DIAGNOSIS — M25.50 POLYARTHRALGIA: ICD-10-CM

## 2024-11-25 DIAGNOSIS — E66.811 CLASS 1 OBESITY DUE TO EXCESS CALORIES WITH SERIOUS COMORBIDITY AND BODY MASS INDEX (BMI) OF 33.0 TO 33.9 IN ADULT: ICD-10-CM

## 2024-11-25 DIAGNOSIS — E78.5 HYPERLIPIDEMIA LDL GOAL <100: ICD-10-CM

## 2024-11-25 LAB
ALBUMIN SERPL BCG-MCNC: 4.1 G/DL (ref 3.5–5.2)
ALP SERPL-CCNC: 79 U/L (ref 40–150)
ALT SERPL W P-5'-P-CCNC: 22 U/L (ref 0–50)
ANION GAP SERPL CALCULATED.3IONS-SCNC: 9 MMOL/L (ref 7–15)
AST SERPL W P-5'-P-CCNC: 18 U/L (ref 0–45)
BILIRUB SERPL-MCNC: 0.6 MG/DL
BUN SERPL-MCNC: 10.1 MG/DL (ref 6–20)
CALCIUM SERPL-MCNC: 9.2 MG/DL (ref 8.8–10.4)
CHLORIDE SERPL-SCNC: 102 MMOL/L (ref 98–107)
CHOLEST SERPL-MCNC: 153 MG/DL
CREAT SERPL-MCNC: 0.45 MG/DL (ref 0.51–0.95)
CREAT UR-MCNC: 160 MG/DL
EGFRCR SERPLBLD CKD-EPI 2021: >90 ML/MIN/1.73M2
ERYTHROCYTE [DISTWIDTH] IN BLOOD BY AUTOMATED COUNT: 11.5 % (ref 10–15)
EST. AVERAGE GLUCOSE BLD GHB EST-MCNC: 146 MG/DL
FASTING STATUS PATIENT QL REPORTED: ABNORMAL
FASTING STATUS PATIENT QL REPORTED: ABNORMAL
GLUCOSE SERPL-MCNC: 158 MG/DL (ref 70–99)
HBA1C MFR BLD: 6.7 % (ref 0–5.6)
HCO3 SERPL-SCNC: 26 MMOL/L (ref 22–29)
HCT VFR BLD AUTO: 38.3 % (ref 35–47)
HDLC SERPL-MCNC: 38 MG/DL
HGB BLD-MCNC: 13.6 G/DL (ref 11.7–15.7)
LDLC SERPL CALC-MCNC: 81 MG/DL
MCH RBC QN AUTO: 28.4 PG (ref 26.5–33)
MCHC RBC AUTO-ENTMCNC: 35.5 G/DL (ref 31.5–36.5)
MCV RBC AUTO: 80 FL (ref 78–100)
MICROALBUMIN UR-MCNC: <12 MG/L
MICROALBUMIN/CREAT UR: NORMAL MG/G{CREAT}
NONHDLC SERPL-MCNC: 115 MG/DL
PLATELET # BLD AUTO: 278 10E3/UL (ref 150–450)
POTASSIUM SERPL-SCNC: 4.4 MMOL/L (ref 3.4–5.3)
PROT SERPL-MCNC: 7.1 G/DL (ref 6.4–8.3)
RBC # BLD AUTO: 4.79 10E6/UL (ref 3.8–5.2)
RHEUMATOID FACT SERPL-ACNC: <10 IU/ML
SODIUM SERPL-SCNC: 137 MMOL/L (ref 135–145)
TRIGL SERPL-MCNC: 172 MG/DL
WBC # BLD AUTO: 7.8 10E3/UL (ref 4–11)

## 2024-11-25 PROCEDURE — 99214 OFFICE O/P EST MOD 30 MIN: CPT | Mod: 25 | Performed by: FAMILY MEDICINE

## 2024-11-25 PROCEDURE — 10060 I&D ABSCESS SIMPLE/SINGLE: CPT | Performed by: FAMILY MEDICINE

## 2024-11-25 PROCEDURE — 83036 HEMOGLOBIN GLYCOSYLATED A1C: CPT | Performed by: FAMILY MEDICINE

## 2024-11-25 PROCEDURE — 82570 ASSAY OF URINE CREATININE: CPT | Performed by: FAMILY MEDICINE

## 2024-11-25 PROCEDURE — 86431 RHEUMATOID FACTOR QUANT: CPT | Performed by: FAMILY MEDICINE

## 2024-11-25 PROCEDURE — 99207 PR FOOT EXAM NO CHARGE: CPT | Performed by: FAMILY MEDICINE

## 2024-11-25 PROCEDURE — 36415 COLL VENOUS BLD VENIPUNCTURE: CPT | Performed by: FAMILY MEDICINE

## 2024-11-25 PROCEDURE — 82043 UR ALBUMIN QUANTITATIVE: CPT | Performed by: FAMILY MEDICINE

## 2024-11-25 PROCEDURE — 85027 COMPLETE CBC AUTOMATED: CPT | Performed by: FAMILY MEDICINE

## 2024-11-25 PROCEDURE — 80061 LIPID PANEL: CPT | Performed by: FAMILY MEDICINE

## 2024-11-25 PROCEDURE — 86200 CCP ANTIBODY: CPT | Performed by: FAMILY MEDICINE

## 2024-11-25 PROCEDURE — 80053 COMPREHEN METABOLIC PANEL: CPT | Performed by: FAMILY MEDICINE

## 2024-11-25 RX ORDER — TIRZEPATIDE 10 MG/.5ML
10 INJECTION, SOLUTION SUBCUTANEOUS
Qty: 2 ML | Refills: 4 | Status: SHIPPED | OUTPATIENT
Start: 2024-11-25

## 2024-11-25 ASSESSMENT — PATIENT HEALTH QUESTIONNAIRE - PHQ9
SUM OF ALL RESPONSES TO PHQ QUESTIONS 1-9: 2
10. IF YOU CHECKED OFF ANY PROBLEMS, HOW DIFFICULT HAVE THESE PROBLEMS MADE IT FOR YOU TO DO YOUR WORK, TAKE CARE OF THINGS AT HOME, OR GET ALONG WITH OTHER PEOPLE: NOT DIFFICULT AT ALL
SUM OF ALL RESPONSES TO PHQ QUESTIONS 1-9: 2

## 2024-11-25 ASSESSMENT — PAIN SCALES - GENERAL: PAINLEVEL_OUTOF10: EXTREME PAIN (8)

## 2024-11-25 NOTE — LETTER
My Depression Action Plan  Name: Isabel Reeder   Date of Birth 1985  Date: 11/25/2024    My doctor: Neal Harris   My clinic: [facility]  [address]  [place] [address] [phone]            GREEN    ZONE   Good Control    What it looks like:   Things are going generally well. You have normal ups and downs. You may even feel depressed from time to time, but bad moods usually last less than a day.   What you need to do:  Continue to care for yourself (see self care plan)  Check your depression survival kit and update it as needed  Follow your physician s recommendations including any medication.  Do not stop taking medication unless you consult with your physician first.             YELLOW         ZONE Getting Worse    What it looks like:   Depression is starting to interfere with your life.   It may be hard to get out of bed; you may be starting to isolate yourself from others.  Symptoms of depression are starting to last most all day and this has happened for several days.   You may have suicidal thoughts but they are not constant.   What you need to do:     Call your care team. Your response to treatment will improve if you keep your care team informed of your progress. Yellow periods are signs an adjustment may need to be made.     Continue your self-care.  Just get dressed and ready for the day.  Don't give yourself time to talk yourself out of it.    Talk to someone in your support network.    Open up your Depression Self-Care Plan/Wellness Kit.             RED    ZONE Medical Alert - Get Help    What it looks like:   Depression is seriously interfering with your life.   You may experience these or other symptoms: You can t get out of bed most days, can t work or engage in other necessary activities, you have trouble taking care of basic hygiene, or basic responsibilities, thoughts of suicide or death that will not go away,  self-injurious behavior.     What you need to do:  Call your care team and request a same-day appointment. If they are not available (weekends or after hours) call your local crisis line, emergency room or 911.          Depression Self-Care Plan / Wellness Kit    Many people find that medication and therapy are helpful treatments for managing depression. In addition, making small changes to your everyday life can help to boost your mood and improve your wellbeing. Below are some tips for you to consider. Be sure to talk with your medical provider and/or behavioral health consultant if your symptoms are worsening or not improving.     Sleep   Sleep hygiene  means all of the habits that support good, restful sleep. It includes maintaining a consistent bedtime and wake time, using your bedroom only for sleeping or sex, and keeping the bedroom dark and free of distractions like a computer, smartphone, or television.     Develop a Healthy Routine  Maintain good hygiene. Get out of bed in the morning, make your bed, brush your teeth, take a shower, and get dressed. Don t spend too much time viewing media that makes you feel stressed. Find time to relax each day.    Exercise  Get some form of exercise every day. This will help reduce pain and release endorphins, the  feel good  chemicals in your brain. It can be as simple as just going for a walk or doing some gardening, anything that will get you moving.      Diet  Strive to eat healthy foods, including fruits and vegetables. Drink plenty of water. Avoid excessive sugar, caffeine, alcohol, and other mood-altering substances.     Stay Connected with Others  Stay in touch with friends and family members.    Manage Your Mood  Try deep breathing, massage therapy, biofeedback, or meditation. Take part in fun activities when you can. Try to find something to smile about each day.     Psychotherapy  Be open to working with a therapist if your provider recommends it.      Medication  Be sure to take your medication as prescribed. Most anti-depressants need to be taken every day. It usually takes several weeks for medications to work. Not all medicines work for all people. It is important to follow-up with your provider to make sure you have a treatment plan that is working for you. Do not stop your medication abruptly without first discussing it with your provider.    Crisis Resources   These hotlines are for both adults and children. They and are open 24 hours a day, 7 days a week unless noted otherwise.    National Suicide Prevention Lifeline   988 or 8-895-842-ALRB (8024)    Crisis Text Line    www.crisistextline.org  Text HOME to 256956 from anywhere in the [place], anytime, about any type of crisis. A live, trained crisis counselor will receive the text and respond quickly.    [facility] Lifeline for LGBTQ Youth  A national crisis intervention and suicide lifeline for LGBTQ youth under 25. Provides a safe place to talk without judgement. Call 1-774.256.9986; text START to 582050 or visit www.thetrevorproject.org to talk to a trained counselor.    For [facility] crisis numbers, visit the [facility] website at:  https://[place].gov/dhs/people-we-serve/adults/health-care/mental-health/resources/crisis-contacts.jsp

## 2024-11-25 NOTE — PROGRESS NOTES
Assessment & Plan   Isabel was seen today for diabetes.    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  Most recent hemoglobin A1c was 6.7%.  Patient's weight continues to increase despite taking Mounjaro 7.5 mg weekly.  We mutually agreed to increase her dose to 10 mg weekly.  Patient stopped taking Flex H/A.  -     CBC with platelets; Future  -     FOOT EXAM  -     MOUNJARO 10 MG/0.5ML SOAJ; Inject 0.5 mLs (10 mg) subcutaneously every 7 days.    Class 1 obesity due to excess calories with serious comorbidity and body mass index (BMI) of 33.0 to 33.9 in adult  -     MOUNJARO 10 MG/0.5ML SOAJ; Inject 0.5 mLs (10 mg) subcutaneously every 7 days.    Hyperlipidemia LDL goal <100  -     Lipid panel reflex to direct LDL Fasting    Borderline glaucoma with ocular hypertension, bilateral  Has regular follow-up with ophthalmology.    Polyarthralgia  Sensation of fullness in bilateral hands and both knees.  Denies any specific trauma.  Differentials are extremely broad which includes fluid overload  early autoimmune disorder. Basic labs obtained today.  -     Rheumatoid factor; Future  -     Cyclic Citrullinated Peptide Antibody IgG; Future    Carbuncle  Small carbuncle noted on the right upper chest wall.  Sterile stab incision performed with copious amount of purulent drainage.    Other orders  -     PRIMARY CARE FOLLOW-UP SCHEDULING  -     Extra Tube; Future  -     Comprehensive metabolic panel (BMP + Alb, Alk Phos, ALT, AST, Total. Bili, TP)  -     Hemoglobin A1c  -     Albumin Random Urine Quantitative with Creat Ratio  -     PRIMARY CARE FOLLOW-UP SCHEDULING; Future      Follow-up:     Follow-up Visit   Expected date:  Feb 25, 2025 (Approximate)      Follow Up Appointment Details:     Follow-up with whom?: Me     "Follow-Up for what?: Chronic Disease f/u    Chronic Disease f/u: Diabetes    How?: In Person or Virtual                 BMI  Estimated body mass index is 34.05 kg/m  as calculated from the following:    Height as of this encounter: 1.618 m (5' 3.7\").    Weight as of this encounter: 89.1 kg (196 lb 8 oz).     Tania Hall is a 39 year old, presenting for the following health issues:  Diabetes        11/25/2024     9:07 AM   Additional Questions   Roomed by Kaden ROCA     History of Present Illness       Diabetes:   She presents for follow up of diabetes.    She is not checking blood glucose.         She has no concerns regarding her diabetes at this time.   She is not experiencing numbness or burning in feet, excessive thirst, blurry vision, weight changes or redness, sores or blisters on feet.             Diabetes Follow-up    How often are you checking your blood sugar? Continuous glucose monitor  What time of day are you checking your blood sugars (select all that apply)?  Not applicable  Have you had any blood sugars above 200?  Not applicable  Have you had any blood sugars below 70?  Not applicable  What symptoms do you notice when your blood sugar is low?  Not applicable  Tried Dexacom for a few months. She had bleeding with placement. She decided to stop using it.     Started taking mounjaro. Weight increased slightly.   Wt Readings from Last 10 Encounters:   11/25/24 89.1 kg (196 lb 8 oz)   08/21/24 86.3 kg (190 lb 3.2 oz)   07/18/24 86.2 kg (190 lb)   04/12/24 84.8 kg (187 lb)   02/26/24 84.1 kg (185 lb 8 oz)   01/16/23 88.5 kg (195 lb)   01/30/21 86.2 kg (190 lb)   11/01/14 101.6 kg (224 lb)   10/29/14 99.8 kg (220 lb)   12/02/11 100.2 kg (221 lb)       Hyperlipidemia Follow-Up  Are you regularly taking any medication or supplement to lower your cholesterol?   Yes- Atorvastatin  Are you having muscle aches or other side effects that you think could be caused by your cholesterol lowering medication?  " "No    BP Readings from Last 2 Encounters:   11/25/24 131/83   08/21/24 128/84     Hemoglobin A1C (%)   Date Value   08/21/2024 6.7 (H)   04/12/2024 8.0 (H)   08/12/2010 6.6 (H)   01/05/2010 7.6 (H)     LDL Cholesterol Calculated (mg/dL)   Date Value   08/21/2024 146 (H)   04/12/2024 97   08/12/2010 82   01/05/2010 84       Review of Systems  Constitutional, neuro, ENT, endocrine, pulmonary, cardiac, gastrointestinal, genitourinary, musculoskeletal, integument and psychiatric systems are negative, except as otherwise noted.      Objective    /83   Pulse 105   Temp 97.3  F (36.3  C) (Temporal)   Resp 16   Ht 1.618 m (5' 3.7\")   Wt 89.1 kg (196 lb 8 oz)   LMP 11/05/2024 (Exact Date)   SpO2 100%   BMI 34.05 kg/m    Body mass index is 34.05 kg/m .  Physical Exam   GENERAL: alert and no distress  NECK: no adenopathy, no asymmetry, masses, or scars  RESP: lungs clear to auscultation - no rales, rhonchi or wheezes  CV: regular rate and rhythm, normal S1 S2, no S3 or S4, no murmur, click or rub, no peripheral edema  MS: no gross musculoskeletal defects noted, no edema  SKIN: Effected area cleaned with betadine x 3 then wiped away with alcoholol.  1 pecent lidocaine with epineprhine used to infiltrate the area with good anethesia.  Number 11 scapel used to make a stab incion.  Purlent drainage was removed. Appropriate wound care dressing applied.  Pt tolerated preocedure well.    NEURO: Normal strength and tone, mentation intact and speech normal  PSYCH: mentation appears normal, affect normal/bright  Diabetic foot exam: normal DP and PT pulses, no trophic changes or ulcerative lesions, and normal sensory exam    Results for orders placed or performed in visit on 11/25/24 (from the past 24 hours)   Extra Tube *Canceled*    Narrative    The following orders were created for panel order Extra Tube.  Procedure                               Abnormality         Status                     ---------                     "           -----------         ------                     Extra Purple Top Tube[[id number]]                                                         Please view results for these tests on the individual orders.   Hemoglobin A1c   Result Value Ref Range    Estimated Average Glucose 146 (H) <117 mg/dL    Hemoglobin A1C 6.7 (H) 0.0 - 5.6 %   CBC with platelets   Result Value Ref Range    WBC Count 7.8 4.0 - 11.0 10e3/uL    RBC Count 4.79 3.80 - 5.20 10e6/uL    Hemoglobin 13.6 11.7 - 15.7 g/dL    Hematocrit 38.3 35.0 - 47.0 %    MCV 80 78 - 100 fL    MCH 28.4 26.5 - 33.0 pg    MCHC 35.5 31.5 - 36.5 g/dL    RDW 11.5 10.0 - 15.0 %    Platelet Count 278 150 - 450 10e3/uL           Signed Electronically by: Neal Harris MD

## 2024-11-26 LAB — CCP AB SER IA-ACNC: 2 U/ML

## 2025-01-20 DIAGNOSIS — E66.811 CLASS 1 OBESITY DUE TO EXCESS CALORIES WITH SERIOUS COMORBIDITY AND BODY MASS INDEX (BMI) OF 33.0 TO 33.9 IN ADULT: ICD-10-CM

## 2025-01-20 DIAGNOSIS — E66.09 CLASS 1 OBESITY DUE TO EXCESS CALORIES WITH SERIOUS COMORBIDITY AND BODY MASS INDEX (BMI) OF 33.0 TO 33.9 IN ADULT: ICD-10-CM

## 2025-01-20 DIAGNOSIS — E11.3393 CONTROLLED TYPE 2 DIABETES MELLITUS WITH BOTH EYES AFFECTED BY MODERATE NONPROLIFERATIVE RETINOPATHY WITHOUT MACULAR EDEMA, WITHOUT LONG-TERM CURRENT USE OF INSULIN (H): ICD-10-CM

## 2025-01-20 RX ORDER — TIRZEPATIDE 10 MG/.5ML
INJECTION, SOLUTION SUBCUTANEOUS
Qty: 2 ML | Refills: 1 | Status: SHIPPED | OUTPATIENT
Start: 2025-01-20

## 2025-02-26 ENCOUNTER — OFFICE VISIT (OUTPATIENT)
Dept: FAMILY MEDICINE | Facility: CLINIC | Age: 40
End: 2025-02-26
Payer: COMMERCIAL

## 2025-02-26 VITALS
BODY MASS INDEX: 33.63 KG/M2 | DIASTOLIC BLOOD PRESSURE: 80 MMHG | OXYGEN SATURATION: 100 % | HEART RATE: 92 BPM | SYSTOLIC BLOOD PRESSURE: 134 MMHG | RESPIRATION RATE: 16 BRPM | WEIGHT: 197 LBS | HEIGHT: 64 IN | TEMPERATURE: 97.2 F

## 2025-02-26 DIAGNOSIS — N93.8 DUB (DYSFUNCTIONAL UTERINE BLEEDING): ICD-10-CM

## 2025-02-26 DIAGNOSIS — E66.811 CLASS 1 OBESITY DUE TO EXCESS CALORIES WITH SERIOUS COMORBIDITY AND BODY MASS INDEX (BMI) OF 33.0 TO 33.9 IN ADULT: ICD-10-CM

## 2025-02-26 DIAGNOSIS — E11.3393 CONTROLLED TYPE 2 DIABETES MELLITUS WITH BOTH EYES AFFECTED BY MODERATE NONPROLIFERATIVE RETINOPATHY WITHOUT MACULAR EDEMA, WITHOUT LONG-TERM CURRENT USE OF INSULIN (H): Primary | ICD-10-CM

## 2025-02-26 DIAGNOSIS — E66.09 CLASS 1 OBESITY DUE TO EXCESS CALORIES WITH SERIOUS COMORBIDITY AND BODY MASS INDEX (BMI) OF 33.0 TO 33.9 IN ADULT: ICD-10-CM

## 2025-02-26 DIAGNOSIS — R10.2 PELVIC PAIN IN FEMALE: ICD-10-CM

## 2025-02-26 DIAGNOSIS — Z23 ENCOUNTER FOR IMMUNIZATION: ICD-10-CM

## 2025-02-26 LAB
ERYTHROCYTE [DISTWIDTH] IN BLOOD BY AUTOMATED COUNT: 11.8 % (ref 10–15)
EST. AVERAGE GLUCOSE BLD GHB EST-MCNC: 140 MG/DL
HBA1C MFR BLD: 6.5 % (ref 0–5.6)
HCT VFR BLD AUTO: 41.1 % (ref 35–47)
HGB BLD-MCNC: 14.2 G/DL (ref 11.7–15.7)
MCH RBC QN AUTO: 27.6 PG (ref 26.5–33)
MCHC RBC AUTO-ENTMCNC: 34.5 G/DL (ref 31.5–36.5)
MCV RBC AUTO: 80 FL (ref 78–100)
PLATELET # BLD AUTO: 281 10E3/UL (ref 150–450)
RBC # BLD AUTO: 5.15 10E6/UL (ref 3.8–5.2)
WBC # BLD AUTO: 7.1 10E3/UL (ref 4–11)

## 2025-02-26 PROCEDURE — 85027 COMPLETE CBC AUTOMATED: CPT | Performed by: FAMILY MEDICINE

## 2025-02-26 PROCEDURE — 80061 LIPID PANEL: CPT | Performed by: FAMILY MEDICINE

## 2025-02-26 PROCEDURE — 36415 COLL VENOUS BLD VENIPUNCTURE: CPT | Performed by: FAMILY MEDICINE

## 2025-02-26 PROCEDURE — 90480 ADMN SARSCOV2 VAC 1/ONLY CMP: CPT | Performed by: FAMILY MEDICINE

## 2025-02-26 PROCEDURE — 83036 HEMOGLOBIN GLYCOSYLATED A1C: CPT | Performed by: FAMILY MEDICINE

## 2025-02-26 PROCEDURE — 91320 SARSCV2 VAC 30MCG TRS-SUC IM: CPT | Performed by: FAMILY MEDICINE

## 2025-02-26 PROCEDURE — 99214 OFFICE O/P EST MOD 30 MIN: CPT | Performed by: FAMILY MEDICINE

## 2025-02-26 PROCEDURE — 82670 ASSAY OF TOTAL ESTRADIOL: CPT | Performed by: FAMILY MEDICINE

## 2025-02-26 PROCEDURE — G2211 COMPLEX E/M VISIT ADD ON: HCPCS | Performed by: FAMILY MEDICINE

## 2025-02-26 RX ORDER — TIRZEPATIDE 12.5 MG/.5ML
12.5 INJECTION, SOLUTION SUBCUTANEOUS
Qty: 6 ML | Refills: 2 | Status: SHIPPED | OUTPATIENT
Start: 2025-02-26

## 2025-02-26 ASSESSMENT — PAIN SCALES - GENERAL: PAINLEVEL_OUTOF10: MODERATE PAIN (5)

## 2025-02-26 NOTE — PROGRESS NOTES
Assessment & Plan     Isabel was seen today for diabetes.    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  Most recent hemoglobin A1c was 6.5%.  Patient's weight continues to increase despite taking Mounjaro 10 mg weekly.  We mutually agreed to increase her dose to 12.5 mg weekly.  -     Lipid panel reflex to direct LDL Fasting; Future  -     Hemoglobin A1c; Future  -     MOUNJARO 12.5 MG/0.5ML SOAJ; Inject 0.5 mLs (12.5 mg) subcutaneously every 7 days.    Pelvic pain in female  DUB (dysfunctional uterine bleeding)  Patient reported intermittent episodes of mild pelvic pain and heavy bleeding with blood clots.  She had a menstrual cycle in the beginning of February and had another menstrual cycle 24 days later.  Reports changes in her mood in the week of her menstrual cycles.   -     CBC with platelets; Future  -     US Pelvic Complete with Transvaginal; Future    Encounter for immunization  -     COVID-19 12+ (PFIZER)    Other orders  -     REVIEW OF HEALTH MAINTENANCE PROTOCOL ORDERS  -     PRIMARY CARE FOLLOW-UP SCHEDULING; Future        Health Maintenance Reviewed:  Reviewed and up to date  Follow-up:   Follow-up Visit   Expected date:  Aug 26, 2025 (Approximate)      Follow Up Appointment Details:     Follow-up with whom?: Me    Follow-Up for what?: Chronic Disease f/u    Chronic Disease f/u: Diabetes    How?: In Person                Total time spent: 38 minutes. This time included reviewing previous records, tests, and/or imaging studies; counseling on current medical conditions; interpreting clinical signs and symptoms; considering differential diagnoses; discussing additional diagnostic  "testing options; developing a treatment plan; documenting findings; and providing follow-up recommendations.    BMI  Estimated body mass index is 34.13 kg/m  as calculated from the following:    Height as of this encounter: 1.618 m (5' 3.7\").    Weight as of this encounter: 89.4 kg (197 lb).       Tania Hall is a 39 year old, presenting for the following health issues:  Diabetes        2/26/2025    10:52 AM   Additional Questions   Roomed by Kaden ROCA     History of Present Illness       Diabetes:   She presents for follow up of diabetes.   She is checking home blood glucose with a continuous glucose monitor.   She checks blood glucose after meals.  Blood glucose is sometimes over 200 and never under 70. She is aware of hypoglycemia symptoms including shakiness and weakness.    She has no concerns regarding her diabetes at this time.   She is not experiencing numbness or burning in feet, excessive thirst, blurry vision, weight changes or redness, sores or blisters on feet.           She eats 2-3 servings of fruits and vegetables daily.She consumes 0 sweetened beverage(s) daily.She exercises with enough effort to increase her heart rate 20 to 29 minutes per day.  She exercises with enough effort to increase her heart rate 4 days per week. She is missing 1 dose(s) of medications per week.     Wt Readings from Last 10 Encounters:   02/26/25 89.4 kg (197 lb)   11/25/24 89.1 kg (196 lb 8 oz)   08/21/24 86.3 kg (190 lb 3.2 oz)   07/18/24 86.2 kg (190 lb)   04/12/24 84.8 kg (187 lb)   02/26/24 84.1 kg (185 lb 8 oz)   01/16/23 88.5 kg (195 lb)   01/30/21 86.2 kg (190 lb)   11/01/14 101.6 kg (224 lb)   10/29/14 99.8 kg (220 lb)     FDLMP: 02/24/2025  Previous cycle: 02/01/2025 Last for 5 days.    Review of Systems  Constitutional, HEENT, cardiovascular, pulmonary, gi and gu systems are negative, except as otherwise noted.      Objective    /80   Pulse 92   Temp 97.2  F (36.2  C) (Temporal)   Resp 16   Ht " "1.618 m (5' 3.7\")   Wt 89.4 kg (197 lb)   LMP  (LMP Unknown)   SpO2 100%   BMI 34.13 kg/m    Body mass index is 34.13 kg/m .  Physical Exam   GENERAL: alert and no distress  NECK: no adenopathy, no asymmetry, masses, or scars  RESP: lungs clear to auscultation - no rales, rhonchi or wheezes  CV: regular rate and rhythm, normal S1 S2, no S3 or S4, no murmur, click or rub, no peripheral edema  ABDOMEN: soft, nontender, no hepatosplenomegaly, no masses and bowel sounds normal  MS: no gross musculoskeletal defects noted, no edema    Results for orders placed or performed in visit on 02/26/25   Hemoglobin A1c     Status: Abnormal   Result Value Ref Range    Estimated Average Glucose 140 (H) <117 mg/dL    Hemoglobin A1C 6.5 (H) 0.0 - 5.6 %   CBC with platelets     Status: Normal   Result Value Ref Range    WBC Count 7.1 4.0 - 11.0 10e3/uL    RBC Count 5.15 3.80 - 5.20 10e6/uL    Hemoglobin 14.2 11.7 - 15.7 g/dL    Hematocrit 41.1 35.0 - 47.0 %    MCV 80 78 - 100 fL    MCH 27.6 26.5 - 33.0 pg    MCHC 34.5 31.5 - 36.5 g/dL    RDW 11.8 10.0 - 15.0 %    Platelet Count 281 150 - 450 10e3/uL           Signed Electronically by: Neal Harris MD    "

## 2025-02-27 LAB
CHOLEST SERPL-MCNC: 156 MG/DL
ESTRADIOL SERPL-MCNC: 20 PG/ML
FASTING STATUS PATIENT QL REPORTED: YES
HDLC SERPL-MCNC: 40 MG/DL
LDLC SERPL CALC-MCNC: 93 MG/DL
NONHDLC SERPL-MCNC: 116 MG/DL
TRIGL SERPL-MCNC: 115 MG/DL

## 2025-03-03 ENCOUNTER — ANCILLARY PROCEDURE (OUTPATIENT)
Dept: ULTRASOUND IMAGING | Facility: CLINIC | Age: 40
End: 2025-03-03
Attending: FAMILY MEDICINE
Payer: COMMERCIAL

## 2025-03-03 ENCOUNTER — MYC MEDICAL ADVICE (OUTPATIENT)
Dept: FAMILY MEDICINE | Facility: CLINIC | Age: 40
End: 2025-03-03
Payer: COMMERCIAL

## 2025-03-03 DIAGNOSIS — R10.2 PELVIC PAIN IN FEMALE: Primary | ICD-10-CM

## 2025-03-03 DIAGNOSIS — N93.8 DUB (DYSFUNCTIONAL UTERINE BLEEDING): ICD-10-CM

## 2025-03-03 PROCEDURE — 76830 TRANSVAGINAL US NON-OB: CPT

## 2025-03-04 ENCOUNTER — TELEPHONE (OUTPATIENT)
Dept: FAMILY MEDICINE | Facility: CLINIC | Age: 40
End: 2025-03-04
Payer: COMMERCIAL

## 2025-03-04 NOTE — TELEPHONE ENCOUNTER
Triage,   Please see message below and advise.   Patient already sent So1 message yesterday regarding this.   Still awaiting response from FS.  Thanks!  Adela MARTINI

## 2025-03-04 NOTE — TELEPHONE ENCOUNTER
FS,    Patient calling to report struggling to get in to get sonohysterogram completed  Imaging tech instructed patient that this test needs to be scheduled within 10days of the first day of her period  Tech states the process is patient is to call on first day of period and see if openings within appropriate time frame, states can take a few months for cycle and open appointments to line up  Patient requesting with this information advice on if the test is still recommended and if its okay to wait at least one or two months   Wondering if alternative test or OB consult would be preferred  Please advice    Patient okay with mychart or call from triage for follow up    ThanksMiryam RN

## 2025-03-04 NOTE — RESULT ENCOUNTER NOTE
"Dear Isabel,   Here are your recent results:  Your ultrasound shows a very small mass 1.1 cm.  This is either a fibroid or a small polyp.  Both conditions are benign \"Not cancerous .\"    I placed an order for saline sonogram in your chart.  It may take a couple of cycles in order for them to get you on the schedule.  This is okay and we can wait for a couple of months until you find an appointment    Best Regards  Neal Harris MD"

## 2025-03-04 NOTE — TELEPHONE ENCOUNTER
Order/Referral Request    Who is requesting: Patient    Orders being requested: sonohysterogram- per ultrasound result report    Reason service is needed/diagnosis: per ultrasound result report    When are orders needed by: ASAP    Has this been discussed with Provider: Yes-[employer] message sent 3/3    Does patient have a preference on a Group/Provider/Facility? [facility]    Does patient have an appointment scheduled?: No    Where to send orders: Place orders within Epic    Could we send this information to you in eEye or would you prefer to receive a phone call?:   Patient would prefer a phone call   Okay to leave a detailed message?: Yes at Cell number on file:    Telephone Information:   Mobile [phone]

## 2025-03-04 NOTE — TELEPHONE ENCOUNTER
MANDI (ANURAG),  Patient also calling to request sonohysterogram order be placed today if possible  Regarding ultrasound results 3/3/25  Please advise  Thanks,  Ami QUINTANILLA RN

## 2025-03-10 ENCOUNTER — HOSPITAL ENCOUNTER (EMERGENCY)
Facility: CLINIC | Age: 40
Discharge: HOME | End: 2025-03-10
Attending: EMERGENCY MEDICINE | Admitting: EMERGENCY MEDICINE
Payer: COMMERCIAL

## 2025-03-10 ENCOUNTER — OFFICE VISIT (OUTPATIENT)
Dept: URGENT CARE | Facility: URGENT CARE | Age: 40
End: 2025-03-10
Payer: COMMERCIAL

## 2025-03-10 ENCOUNTER — APPOINTMENT (OUTPATIENT)
Dept: CT IMAGING | Facility: CLINIC | Age: 40
End: 2025-03-10
Attending: EMERGENCY MEDICINE
Payer: COMMERCIAL

## 2025-03-10 VITALS
DIASTOLIC BLOOD PRESSURE: 85 MMHG | OXYGEN SATURATION: 100 % | SYSTOLIC BLOOD PRESSURE: 148 MMHG | RESPIRATION RATE: 16 BRPM | TEMPERATURE: 98.1 F | HEART RATE: 99 BPM

## 2025-03-10 DIAGNOSIS — R10.32 LLQ ABDOMINAL PAIN: ICD-10-CM

## 2025-03-10 DIAGNOSIS — R07.89 ATYPICAL CHEST PAIN: ICD-10-CM

## 2025-03-10 DIAGNOSIS — R07.9 CHEST PAIN: Primary | ICD-10-CM

## 2025-03-10 LAB
ALBUMIN SERPL BCG-MCNC: 4.4 G/DL (ref 3.5–5.2)
ALP SERPL-CCNC: 87 U/L (ref 40–150)
ALT SERPL W P-5'-P-CCNC: 26 U/L (ref 0–50)
ANION GAP SERPL CALCULATED.3IONS-SCNC: 12 MMOL/L (ref 7–15)
AST SERPL W P-5'-P-CCNC: 25 U/L (ref 0–45)
ATRIAL RATE - MUSE: 103 BPM
BASOPHILS # BLD AUTO: 0 10E3/UL (ref 0–0.2)
BASOPHILS NFR BLD AUTO: 0 %
BILIRUB SERPL-MCNC: 0.7 MG/DL
BUN SERPL-MCNC: 7.9 MG/DL (ref 6–20)
CALCIUM SERPL-MCNC: 9.2 MG/DL (ref 8.8–10.4)
CHLORIDE SERPL-SCNC: 100 MMOL/L (ref 98–107)
CREAT SERPL-MCNC: 0.42 MG/DL (ref 0.51–0.95)
D DIMER PPP FEU-MCNC: <0.27 UG/ML FEU (ref 0–0.5)
DIASTOLIC BLOOD PRESSURE - MUSE: NORMAL MMHG
EGFRCR SERPLBLD CKD-EPI 2021: >90 ML/MIN/1.73M2
EOSINOPHIL # BLD AUTO: 0 10E3/UL (ref 0–0.7)
EOSINOPHIL NFR BLD AUTO: 1 %
ERYTHROCYTE [DISTWIDTH] IN BLOOD BY AUTOMATED COUNT: 11.9 % (ref 10–15)
GLUCOSE SERPL-MCNC: 92 MG/DL (ref 70–99)
HCO3 SERPL-SCNC: 27 MMOL/L (ref 22–29)
HCT VFR BLD AUTO: 40.7 % (ref 35–47)
HGB BLD-MCNC: 14.2 G/DL (ref 11.7–15.7)
HOLD SPECIMEN: NORMAL
IMM GRANULOCYTES # BLD: 0 10E3/UL
IMM GRANULOCYTES NFR BLD: 0 %
INTERPRETATION ECG - MUSE: NORMAL
LIPASE SERPL-CCNC: 50 U/L (ref 13–60)
LYMPHOCYTES # BLD AUTO: 3 10E3/UL (ref 0.8–5.3)
LYMPHOCYTES NFR BLD AUTO: 42 %
MCH RBC QN AUTO: 28.3 PG (ref 26.5–33)
MCHC RBC AUTO-ENTMCNC: 34.9 G/DL (ref 31.5–36.5)
MCV RBC AUTO: 81 FL (ref 78–100)
MONOCYTES # BLD AUTO: 0.3 10E3/UL (ref 0–1.3)
MONOCYTES NFR BLD AUTO: 4 %
NEUTROPHILS # BLD AUTO: 3.8 10E3/UL (ref 1.6–8.3)
NEUTROPHILS NFR BLD AUTO: 53 %
NRBC # BLD AUTO: 0 10E3/UL
NRBC BLD AUTO-RTO: 0 /100
P AXIS - MUSE: 72 DEGREES
PLATELET # BLD AUTO: 272 10E3/UL (ref 150–450)
POTASSIUM SERPL-SCNC: 4 MMOL/L (ref 3.4–5.3)
PR INTERVAL - MUSE: 148 MS
PROT SERPL-MCNC: 7.6 G/DL (ref 6.4–8.3)
QRS DURATION - MUSE: 96 MS
QT - MUSE: 360 MS
QTC - MUSE: 471 MS
R AXIS - MUSE: 39 DEGREES
RBC # BLD AUTO: 5.02 10E6/UL (ref 3.8–5.2)
SODIUM SERPL-SCNC: 139 MMOL/L (ref 135–145)
SYSTOLIC BLOOD PRESSURE - MUSE: NORMAL MMHG
T AXIS - MUSE: 56 DEGREES
TROPONIN T SERPL HS-MCNC: <6 NG/L
VENTRICULAR RATE- MUSE: 103 BPM
WBC # BLD AUTO: 7.2 10E3/UL (ref 4–11)

## 2025-03-10 PROCEDURE — 85014 HEMATOCRIT: CPT | Performed by: EMERGENCY MEDICINE

## 2025-03-10 PROCEDURE — 93005 ELECTROCARDIOGRAM TRACING: CPT

## 2025-03-10 PROCEDURE — 36415 COLL VENOUS BLD VENIPUNCTURE: CPT | Performed by: EMERGENCY MEDICINE

## 2025-03-10 PROCEDURE — 85004 AUTOMATED DIFF WBC COUNT: CPT | Performed by: EMERGENCY MEDICINE

## 2025-03-10 PROCEDURE — 96361 HYDRATE IV INFUSION ADD-ON: CPT

## 2025-03-10 PROCEDURE — 99207 PR NO CHARGE LOS: CPT

## 2025-03-10 PROCEDURE — 250N000009 HC RX 250: Performed by: EMERGENCY MEDICINE

## 2025-03-10 PROCEDURE — 96360 HYDRATION IV INFUSION INIT: CPT

## 2025-03-10 PROCEDURE — 84484 ASSAY OF TROPONIN QUANT: CPT | Performed by: EMERGENCY MEDICINE

## 2025-03-10 PROCEDURE — 250N000011 HC RX IP 250 OP 636: Performed by: EMERGENCY MEDICINE

## 2025-03-10 PROCEDURE — 258N000003 HC RX IP 258 OP 636: Performed by: EMERGENCY MEDICINE

## 2025-03-10 PROCEDURE — 85379 FIBRIN DEGRADATION QUANT: CPT | Performed by: EMERGENCY MEDICINE

## 2025-03-10 PROCEDURE — 80053 COMPREHEN METABOLIC PANEL: CPT | Performed by: EMERGENCY MEDICINE

## 2025-03-10 PROCEDURE — 83690 ASSAY OF LIPASE: CPT | Performed by: EMERGENCY MEDICINE

## 2025-03-10 PROCEDURE — 99285 EMERGENCY DEPT VISIT HI MDM: CPT | Mod: 25

## 2025-03-10 PROCEDURE — 74177 CT ABD & PELVIS W/CONTRAST: CPT

## 2025-03-10 RX ORDER — IOPAMIDOL 755 MG/ML
99 INJECTION, SOLUTION INTRAVASCULAR ONCE
Status: COMPLETED | OUTPATIENT
Start: 2025-03-10 | End: 2025-03-10

## 2025-03-10 RX ADMIN — IOPAMIDOL 99 ML: 755 INJECTION, SOLUTION INTRAVENOUS at 17:56

## 2025-03-10 RX ADMIN — SODIUM CHLORIDE 1000 ML: 0.9 INJECTION, SOLUTION INTRAVENOUS at 14:09

## 2025-03-10 RX ADMIN — SODIUM CHLORIDE 68 ML: 9 INJECTION, SOLUTION INTRAVENOUS at 17:56

## 2025-03-10 ASSESSMENT — ACTIVITIES OF DAILY LIVING (ADL)
ADLS_ACUITY_SCORE: 41

## 2025-03-10 ASSESSMENT — COLUMBIA-SUICIDE SEVERITY RATING SCALE - C-SSRS
2. HAVE YOU ACTUALLY HAD ANY THOUGHTS OF KILLING YOURSELF IN THE PAST MONTH?: NO
1. IN THE PAST MONTH, HAVE YOU WISHED YOU WERE DEAD OR WISHED YOU COULD GO TO SLEEP AND NOT WAKE UP?: NO
6. HAVE YOU EVER DONE ANYTHING, STARTED TO DO ANYTHING, OR PREPARED TO DO ANYTHING TO END YOUR LIFE?: NO

## 2025-03-10 NOTE — ED TRIAGE NOTES
Pt has had llq pain for 1 month, past 2 days pain has gone to chest and left forearm     Triage Assessment (Adult)       Row Name 03/10/25 1156          Triage Assessment    Airway WDL WDL        Respiratory WDL    Respiratory WDL WDL        Cardiac WDL    Cardiac WDL WDL        Peripheral/Neurovascular WDL    Peripheral Neurovascular WDL WDL        Cognitive/Neuro/Behavioral WDL    Cognitive/Neuro/Behavioral WDL WDL

## 2025-03-10 NOTE — ED PROVIDER NOTES
"  Emergency Department Note      History of Present Illness     Chief Complaint   Abdominal Pain      HPI   Isabel Reeder is a 40 year old female with history of type 2 diabetes and hyperlipidemia who presents to the ED for evaluation of abdominal pain. The patient reports that she has been dealing with LLQ abdominal pain for the past month. The past couple days, she has noticed an increase in pain. She states that she has a \"sense of fullness\" in her epigastric region. The pain worsens when she sleeps and it has now radiated to her chest, predominantly the left side. Patient reports of a flutter-like feeling and chest tightness. She has had intermittent weakness and tingling in her left hand. Patient has mild bilateral calf pain. She endorses nausea and slight dysuria, although she is not sure if the pain with urination is from her menstrual period or not. Patient notes that she has been on Mounjaro for about a year and had a change her dosage about 3 weeks ago. Denies sharp or stabbing chest pain. No vomiting. No recent travel or history of blood clots. Denies any chance of pregnancy.    Independent Historian   None    Review of External Notes   Urgent care visit from earlier today    Past Medical History     Medical History and Problem List   Type 2 diabetes  Hyperlipidemia   Hypertriglyceridemia  MDD    Medications   Atorvastatin  Mounjaro  Glipizide  Aspirin 325 mg  Pepcid  Flexeril  Meclizine  Zovirax  Farxiga    Surgical History   Tubal ligation  D&C      Physical Exam     Patient Vitals for the past 24 hrs:   BP Temp Temp src Pulse Resp SpO2   03/10/25 1155 (!) 148/85 98.1  F (36.7  C) Oral 99 16 100 %     Physical Exam  Constitutional: Vital signs reviewed as above  General: Alert  HEENT: Moist mucous membranes  Eyes: Conjunctiva normal.   Neck: Normal range of motion  Cardiovascular: Tachycardic, Regular rhythm and normal heart sounds.  No MRG  Pulmonary/Chest: Effort normal and breath sounds " normal. No respiratory distress. Patient has no wheezes. Patient has no rales.   Abdominal: Soft. Positive bowel sounds. No MRG.  Musculoskeletal/Extremities: Full ROM. Mild calf tenderness bilaterally.  Endo: No pitting edema  Neurological: Alert, no focal deficits.  Skin: Skin is warm and dry.   Psychiatric: Pleasant     Diagnostics     Lab Results   Labs Ordered and Resulted from Time of ED Arrival to Time of ED Departure   COMPREHENSIVE METABOLIC PANEL - Abnormal       Result Value    Sodium 139      Potassium 4.0      Carbon Dioxide (CO2) 27      Anion Gap 12      Urea Nitrogen 7.9      Creatinine 0.42 (*)     GFR Estimate >90      Calcium 9.2      Chloride 100      Glucose 92      Alkaline Phosphatase 87      AST 25      ALT 26      Protein Total 7.6      Albumin 4.4      Bilirubin Total 0.7     LIPASE - Normal    Lipase 50     D DIMER QUANTITATIVE - Normal    D-Dimer Quantitative <0.27     TROPONIN T, HIGH SENSITIVITY - Normal    Troponin T, High Sensitivity <6     CBC WITH PLATELETS AND DIFFERENTIAL    WBC Count 7.2      RBC Count 5.02      Hemoglobin 14.2      Hematocrit 40.7      MCV 81      MCH 28.3      MCHC 34.9      RDW 11.9      Platelet Count 272      % Neutrophils 53      % Lymphocytes 42      % Monocytes 4      % Eosinophils 1      % Basophils 0      % Immature Granulocytes 0      NRBCs per 100 WBC 0      Absolute Neutrophils 3.8      Absolute Lymphocytes 3.0      Absolute Monocytes 0.3      Absolute Eosinophils 0.0      Absolute Basophils 0.0      Absolute Immature Granulocytes 0.0      Absolute NRBCs 0.0         Imaging   CT Abdomen Pelvis w Contrast   Final Result   IMPRESSION:    No acute abnormality in the abdomen or pelvis.             EKG   ECG taken at 11:58, ECG read at 13:44  Sinus tachycardia  Low voltage QRS   New sinus tachycardia as compared to prior, dated 10/26/10.  Rate 103 bpm. OR interval 148 ms. QRS duration 96 ms. QT/QTc 360/471 ms. P-R-T axes 72 39 56.    Independent  Interpretation   EKG shows sinus tachycardia, rate 103, no acute concerning ST or T wave changes, QTc is 471    ED Course      Medications Administered   Medications   sodium chloride 0.9% BOLUS 1,000 mL (0 mLs Intravenous Stopped 3/10/25 1851)   Saline (68 mLs As instructed $Given 3/10/25 1756)   iopamidol (ISOVUE-370) solution 99 mL (99 mLs Intravenous $Given 3/10/25 1756)       Procedures   None     Discussion of Management   None    ED Course   ED Course as of 03/10/25 1904   Mon Mar 10, 2025   1336 I obtained history and examined the patient as noted above.    1828 I rechecked and updated the patient. Plan is for discharge       Additional Documentation  None    Medical Decision Making / Diagnosis     CMS Diagnoses: None    MIPS   None    MDM   Isabel Reeder is a 40 year old female who presents with 2 concerns #1 his left lower quad abdominal pain which has been present for about a month.  She is Mounjaro but has been on this for about a year.  Her last dose increase was a few weeks ago.  She is also complaining of some left-sided chest pain rating to her left arm.  There is been no recent travel.  Given the fact that she was tachycardic and had some shortness of breath I did obtain an EKG.  Fortunately this showed sinus rhythm without signs of ischemia.  A D-dimer was also obtained as given her tachycardia we could not use a PERC rules.  Fortunately her D-dimer was unremarkable ruling out PE.  Troponin was also undetectable at less than 6.  Coupled that with her nondiagnostic EKG and her symptoms for a couple of days acute course and was very unlikely.  I did obtain abdominal labs as well and these were all unremarkable.  She was still concerned about the left lower quad abdominal pain so CT was obtained.  Fortunately this to did not show any acute findings.  We had a long discussion.  Show monitor symptoms closely.  Follow-up as symptoms warrant or sooner if they progress.    Disposition   The patient was  discharged.     Diagnosis     ICD-10-CM    1. Atypical chest pain  R07.89       2. LLQ abdominal pain  R10.32         Scribe Disclosure:  I, Kulwinder Antunez, am serving as a scribe at 1:35 PM on 3/10/2025 to document services personally performed by Khanh Teixeira MD based on my observations and the provider's statements to me.        Khanh Teixeira MD  03/10/25 1904

## 2025-03-13 ENCOUNTER — PATIENT OUTREACH (OUTPATIENT)
Dept: CARE COORDINATION | Facility: CLINIC | Age: 40
End: 2025-03-13
Payer: COMMERCIAL

## 2025-03-27 ENCOUNTER — PATIENT OUTREACH (OUTPATIENT)
Dept: CARE COORDINATION | Facility: CLINIC | Age: 40
End: 2025-03-27
Payer: COMMERCIAL

## 2025-04-02 ENCOUNTER — ANCILLARY PROCEDURE (OUTPATIENT)
Dept: ULTRASOUND IMAGING | Facility: CLINIC | Age: 40
End: 2025-04-02
Attending: FAMILY MEDICINE
Payer: COMMERCIAL

## 2025-04-02 DIAGNOSIS — R10.2 PELVIC PAIN IN FEMALE: ICD-10-CM

## 2025-04-02 PROCEDURE — 58340 CATHETER FOR HYSTEROGRAPHY: CPT

## 2025-04-02 PROCEDURE — 76831 ECHO EXAM UTERUS: CPT

## 2025-05-01 ENCOUNTER — OFFICE VISIT (OUTPATIENT)
Dept: OPHTHALMOLOGY | Facility: CLINIC | Age: 40
End: 2025-05-01
Payer: COMMERCIAL

## 2025-05-01 DIAGNOSIS — E11.9 TYPE 2 DIABETES MELLITUS WITHOUT COMPLICATION, WITHOUT LONG-TERM CURRENT USE OF INSULIN (H): ICD-10-CM

## 2025-05-01 DIAGNOSIS — H40.053 BORDERLINE GLAUCOMA OF BOTH EYES WITH OCULAR HYPERTENSION: Primary | ICD-10-CM

## 2025-05-01 DIAGNOSIS — H52.31 ANISOMETROPIA: ICD-10-CM

## 2025-05-01 DIAGNOSIS — H53.022 REFRACTIVE AMBLYOPIA OF LEFT EYE: ICD-10-CM

## 2025-05-01 RX ORDER — LATANOPROST 50 UG/ML
1 SOLUTION/ DROPS OPHTHALMIC AT BEDTIME
Qty: 7.5 ML | Refills: 5 | Status: SHIPPED | OUTPATIENT
Start: 2025-05-01

## 2025-05-01 ASSESSMENT — SLIT LAMP EXAM - LIDS
COMMENTS: NORMAL
COMMENTS: NORMAL

## 2025-05-01 ASSESSMENT — VISUAL ACUITY
OS_CC+: +2
OS_CC: 20/25
OD_CC: 20/20
METHOD: SNELLEN - LINEAR
CORRECTION_TYPE: GLASSES

## 2025-05-01 ASSESSMENT — REFRACTION_WEARINGRX
OD_AXIS: 165
OD_CYLINDER: +0.25
OS_AXIS: 078
OS_CYLINDER: +4.00
OD_SPHERE: PLANO
OS_SPHERE: -1.00

## 2025-05-01 ASSESSMENT — CONF VISUAL FIELD
OD_SUPERIOR_NASAL_RESTRICTION: 0
OS_INFERIOR_NASAL_RESTRICTION: 0
OS_NORMAL: 1
OD_INFERIOR_NASAL_RESTRICTION: 0
METHOD: COUNTING FINGERS
OD_NORMAL: 1
OS_SUPERIOR_NASAL_RESTRICTION: 0
OS_INFERIOR_TEMPORAL_RESTRICTION: 0
OD_INFERIOR_TEMPORAL_RESTRICTION: 0
OD_SUPERIOR_TEMPORAL_RESTRICTION: 0
OS_SUPERIOR_TEMPORAL_RESTRICTION: 0

## 2025-05-01 ASSESSMENT — REFRACTION_MANIFEST
OS_AXIS: 078
OD_SPHERE: PLANO
OD_AXIS: 165
OS_SPHERE: -0.75
OD_CYLINDER: +0.25
OS_CYLINDER: +4.00

## 2025-05-01 ASSESSMENT — TONOMETRY
OD_IOP_MMHG: 23
OS_IOP_MMHG: 27
IOP_METHOD: TONOPEN

## 2025-05-01 ASSESSMENT — CUP TO DISC RATIO
OD_RATIO: 0.55
OS_RATIO: 0.55

## 2025-05-01 NOTE — NURSING NOTE
Chief Complaints and History of Present Illnesses   Patient presents with    COMPREHENSIVE EYE EXAM     Borderline glaucoma of both eyes with ocular hypertension     Chief Complaint(s) and History of Present Illness(es)       COMPREHENSIVE EYE EXAM              Laterality: both eyes    Associated symptoms: dryness.  Negative for eye pain, tearing, flashes and floaters    Treatments tried: no treatments    Pain scale: 0/10    Comments: Borderline glaucoma of both eyes with ocular hypertension              Comments    Pt states vision is the same.  No pain.  No flashes/floaters.  No ocular meds.      DM 2  BS are currently 107.  Lab Results       Component                Value               Date                       A1C                      6.5                 02/26/2025                 A1C                      6.7                 11/25/2024                 A1C                      6.7                 08/21/2024                 A1C                      8.0                 04/12/2024                 A1C                      8.3                 02/26/2024                 A1C                      6.6                 08/12/2010                 A1C                      7.6                 01/05/2010                 A1C                      6.8                 09/29/2009                 A1C                      6.0                 11/21/2008                 A1C                      12.4                07/25/2008              GILBERTO Bridges May 1, 2025 9:05 AM

## 2025-05-01 NOTE — PROGRESS NOTES
A/P  1.) Ocular Hypertension with borderline glaucoma OU  -Tmax 26/26 with slightly thick pachy 584/595  -Possible slight RNFL thinning. Last VF was normal 6 months ago  -Reviewed monitoring vs treatment at this time. Given amblyopia left eye we agreed it would be safer to start treatment and see if she tolerates well  -Latanoprost at bedtime OU    2.) Type 2 DM without ophthalmic complication  -Last A1c 6.5, control much improved lately  -H/o mild NPDR, none today  -Reviewed effects of DM on the eyes and importance of good blood sugar control  -Monitor annually with dilation    3.) Anisometropic amblyopia left eye  -BCVA 20/25 left eye but with high aniso/cyl  -Happy without correction    Follow-up 6 months IOP/RNFL OCT/VF    I have confirmed the patient's CC, HPI and reviewed Past Medical History, Past Surgical History, Social History, Family History, Problem List, Medication List and agree with Tech note.     Odalis Darling, LAURA MOISEO ZENONS

## 2025-05-31 ENCOUNTER — HEALTH MAINTENANCE LETTER (OUTPATIENT)
Age: 40
End: 2025-05-31

## 2025-06-13 ENCOUNTER — MYC MEDICAL ADVICE (OUTPATIENT)
Dept: FAMILY MEDICINE | Facility: CLINIC | Age: 40
End: 2025-06-13
Payer: COMMERCIAL

## 2025-06-13 DIAGNOSIS — E11.9 TYPE 2 DIABETES MELLITUS WITHOUT COMPLICATION, WITHOUT LONG-TERM CURRENT USE OF INSULIN (H): Primary | ICD-10-CM

## 2025-06-16 NOTE — TELEPHONE ENCOUNTER
FS,  Please see below MyChart message and advise.  Pended A1C unsure if any additional labs recommended  Thanks,  Miryam ROWLAND RN

## 2025-06-16 NOTE — TELEPHONE ENCOUNTER
Triage,   Please see message below.   Patient is requesting orders diabetes labs.   Patient has virtual appt w/ FS on 6/25/25.   Thanks!  Adela MARTINI

## 2025-06-17 ENCOUNTER — ANCILLARY PROCEDURE (OUTPATIENT)
Dept: MAMMOGRAPHY | Facility: CLINIC | Age: 40
End: 2025-06-17
Attending: FAMILY MEDICINE
Payer: COMMERCIAL

## 2025-06-17 DIAGNOSIS — Z12.31 VISIT FOR SCREENING MAMMOGRAM: ICD-10-CM

## 2025-06-17 PROCEDURE — 77067 SCR MAMMO BI INCL CAD: CPT | Mod: GC | Performed by: RADIOLOGY

## 2025-06-17 PROCEDURE — 77063 BREAST TOMOSYNTHESIS BI: CPT | Mod: GC | Performed by: RADIOLOGY

## 2025-06-19 ENCOUNTER — LAB (OUTPATIENT)
Dept: LAB | Facility: CLINIC | Age: 40
End: 2025-06-19
Payer: COMMERCIAL

## 2025-06-19 ENCOUNTER — RESULTS FOLLOW-UP (OUTPATIENT)
Dept: FAMILY MEDICINE | Facility: CLINIC | Age: 40
End: 2025-06-19

## 2025-06-19 DIAGNOSIS — E11.9 TYPE 2 DIABETES MELLITUS WITHOUT COMPLICATION, WITHOUT LONG-TERM CURRENT USE OF INSULIN (H): ICD-10-CM

## 2025-06-19 LAB
EST. AVERAGE GLUCOSE BLD GHB EST-MCNC: 105 MG/DL
HBA1C MFR BLD: 5.3 % (ref 0–5.6)

## 2025-06-25 ENCOUNTER — TELEPHONE (OUTPATIENT)
Dept: FAMILY MEDICINE | Facility: CLINIC | Age: 40
End: 2025-06-25

## 2025-06-25 NOTE — TELEPHONE ENCOUNTER
General Call    Contacts       Contact Date/Time Type Contact Phone/Fax    06/25/2025 06:26 PM CDT Phone (Incoming) Isabel Reeder (Self) [phone] (M)          Reason for Call:  Pt called at 6pm inquiring about her video visit appt with Dr Harris at 5pm.  Pt waited until 6pm.  She would like to reschedule.    What are your questions or concerns:  as above    Date of last appointment with provider: 2/26/25    Could we send this information to you in [facility] or would you prefer to receive a phone call?:   Patient would prefer a phone call   Okay to leave a detailed message?: Yes at Cell number on file:    Telephone Information:   Mobile [phone]

## 2025-07-07 ENCOUNTER — VIRTUAL VISIT (OUTPATIENT)
Dept: FAMILY MEDICINE | Facility: CLINIC | Age: 40
End: 2025-07-07
Payer: COMMERCIAL

## 2025-07-07 DIAGNOSIS — E66.811 CLASS 1 OBESITY DUE TO EXCESS CALORIES WITH SERIOUS COMORBIDITY AND BODY MASS INDEX (BMI) OF 33.0 TO 33.9 IN ADULT: Primary | ICD-10-CM

## 2025-07-07 DIAGNOSIS — H69.92 DYSFUNCTION OF LEFT EUSTACHIAN TUBE: ICD-10-CM

## 2025-07-07 DIAGNOSIS — E66.09 CLASS 1 OBESITY DUE TO EXCESS CALORIES WITH SERIOUS COMORBIDITY AND BODY MASS INDEX (BMI) OF 33.0 TO 33.9 IN ADULT: Primary | ICD-10-CM

## 2025-07-07 PROCEDURE — 98006 SYNCH AUDIO-VIDEO EST MOD 30: CPT | Performed by: FAMILY MEDICINE

## 2025-07-07 RX ORDER — TIRZEPATIDE 12.5 MG/.5ML
12.5 INJECTION, SOLUTION SUBCUTANEOUS
Qty: 6 ML | Refills: 2 | Status: SHIPPED | OUTPATIENT
Start: 2025-07-07

## 2025-07-07 RX ORDER — AZELASTINE 1 MG/ML
1 SPRAY, METERED NASAL 2 TIMES DAILY
Qty: 30 ML | Refills: 0 | Status: SHIPPED | OUTPATIENT
Start: 2025-07-07

## 2025-07-07 NOTE — PROGRESS NOTES
"Isabel is a 40 year old who is being evaluated via a billable video visit.    How would you like to obtain your AVS? MyChart  If the video visit is dropped, the invitation should be resent by: Text to cell phone: [phone]  Will anyone else be joining your video visit? No    Assessment & Plan     Isabel was seen today for diabetes.    Diagnoses and all orders for this visit:    Class 1 obesity due to excess calories with serious comorbidity and body mass index (BMI) of 33.0 to 33.9 in adult  Diabetes:Type II, Complication(s): none. Patient is meeting A1c goal of < 7%. Pateint Self monitoring of blood glucose is at goal of fasting <110 mg/dL.   Immunizations are up-to-date.     Microalbumin is at goal < 30 mg/g.  Pt is up-to-date with recommended annual eye exam and foot exam.  Pt would benefit from no changes..  Aspirin therapy is not indicated in this patient due to age.  -     MOUNJARO 12.5 MG/0.5ML SOAJ auto-injector pen; Inject 0.5 mLs (12.5 mg) subcutaneously every 7 days.    Dysfunction of left eustachian tube  Previously diagnosed with eustachian tube dysfunction.  Desires to get a referral to ENT today.-     Adult ENT  Referral; Future  -     azelastine (ASTELIN) 0.1 % nasal spray; Spray 1 spray into both nostrils 2 times daily.      BMI  Estimated body mass index is 34.13 kg/m  as calculated from the following:    Height as of 2/26/25: 1.618 m (5' 3.7\").    Weight as of 2/26/25: 89.4 kg (197 lb).     Follow-up    Follow-up Visit   Expected date:  Nov 13, 2025 (Approximate)      Follow Up Appointment Details:     Follow-up with whom?: Me    Follow-Up for what?: Chronic Disease f/u    Chronic Disease f/u:  Diabetes  Weight Management       Weight Management New or Return: Return    How?: In Person or Virtual             The longitudinal plan of care for the diagnosis(es)/condition(s) as documented were addressed during this visit. Due to the added complexity in care, I will continue to support " Isabel in the subsequent management and with ongoing continuity of care.    Tania Hall is a 40 year old, presenting for the following health issues:  Diabetes        7/7/2025     3:20 PM   Additional Questions   Roomed by Kaden Velez 40-year-old with type 2 diabetes  And class II obesity.  Patient is taking Mounjaro without side effects.  Desires to continue the same medication.  Weight loss has been slow but stable.    Review of Systems  Constitutional, HEENT, cardiovascular, pulmonary, gi and gu systems are negative, except as otherwise noted.      Objective       Wt Readings from Last 10 Encounters:   02/26/25 89.4 kg (197 lb)   11/25/24 89.1 kg (196 lb 8 oz)   08/21/24 86.3 kg (190 lb 3.2 oz)   07/18/24 86.2 kg (190 lb)   04/12/24 84.8 kg (187 lb)   02/26/24 84.1 kg (185 lb 8 oz)   01/16/23 88.5 kg (195 lb)   01/30/21 86.2 kg (190 lb)   11/01/14 101.6 kg (224 lb)   10/29/14 99.8 kg (220 lb)      Ear pain and pressure constant. Initially attributed to sinus   Vitals:  No vitals were obtained today due to virtual visit.    Physical Exam   GENERAL: alert and no distress  EYES: Eyes grossly normal to inspection.  No discharge or erythema, or obvious scleral/conjunctival abnormalities.  RESP: No audible wheeze, cough, or visible cyanosis.    SKIN: Visible skin clear. No significant rash, abnormal pigmentation or lesions.  NEURO: Cranial nerves grossly intact.  Mentation and speech appropriate for age.  PSYCH: Appropriate affect, tone, and pace of words        Video-Visit Details    Type of service:  Video Visit   Originating Location (pt. Location): Home  Distant Location (provider location):  On-site  Platform used for Video Visit: Kami  Signed Electronically by: Neal Harris MD

## 2025-07-08 ENCOUNTER — PATIENT OUTREACH (OUTPATIENT)
Dept: CARE COORDINATION | Facility: CLINIC | Age: 40
End: 2025-07-08
Payer: COMMERCIAL

## 2025-07-10 ENCOUNTER — PATIENT OUTREACH (OUTPATIENT)
Dept: CARE COORDINATION | Facility: CLINIC | Age: 40
End: 2025-07-10
Payer: COMMERCIAL

## 2025-07-13 PROBLEM — E66.01 CLASS 2 SEVERE OBESITY DUE TO EXCESS CALORIES WITH SERIOUS COMORBIDITY AND BODY MASS INDEX (BMI) OF 37.0 TO 37.9 IN ADULT (H): Status: ACTIVE | Noted: 2025-07-13

## 2025-07-13 PROBLEM — E66.812 CLASS 2 SEVERE OBESITY DUE TO EXCESS CALORIES WITH SERIOUS COMORBIDITY AND BODY MASS INDEX (BMI) OF 37.0 TO 37.9 IN ADULT (H): Status: ACTIVE | Noted: 2025-07-13

## 2025-08-11 ENCOUNTER — E-VISIT (OUTPATIENT)
Dept: FAMILY MEDICINE | Facility: CLINIC | Age: 40
End: 2025-08-11
Payer: COMMERCIAL

## 2025-08-11 DIAGNOSIS — L21.9 SEBORRHEIC DERMATITIS: Primary | ICD-10-CM

## 2025-08-12 ENCOUNTER — PATIENT OUTREACH (OUTPATIENT)
Dept: CARE COORDINATION | Facility: CLINIC | Age: 40
End: 2025-08-12
Payer: COMMERCIAL

## 2025-08-28 ENCOUNTER — MYC MEDICAL ADVICE (OUTPATIENT)
Dept: OPHTHALMOLOGY | Facility: CLINIC | Age: 40
End: 2025-08-28
Payer: COMMERCIAL

## 2025-08-28 DIAGNOSIS — H40.053 BORDERLINE GLAUCOMA OF BOTH EYES WITH OCULAR HYPERTENSION: Primary | ICD-10-CM

## 2025-08-28 RX ORDER — BRIMONIDINE TARTRATE 2 MG/ML
1 SOLUTION/ DROPS OPHTHALMIC 2 TIMES DAILY
Qty: 5 ML | Refills: 11 | Status: SHIPPED | OUTPATIENT
Start: 2025-08-28

## (undated) RX ORDER — FENTANYL CITRATE 50 UG/ML
INJECTION, SOLUTION INTRAMUSCULAR; INTRAVENOUS
Status: DISPENSED
Start: 2024-07-18